# Patient Record
Sex: MALE | Race: WHITE | NOT HISPANIC OR LATINO | Employment: OTHER | URBAN - NONMETROPOLITAN AREA
[De-identification: names, ages, dates, MRNs, and addresses within clinical notes are randomized per-mention and may not be internally consistent; named-entity substitution may affect disease eponyms.]

---

## 2017-01-12 DIAGNOSIS — R73.9 HYPERGLYCEMIA: ICD-10-CM

## 2017-01-12 DIAGNOSIS — I25.10 ATHEROSCLEROSIS OF NATIVE CORONARY ARTERY OF NATIVE HEART WITHOUT ANGINA PECTORIS: ICD-10-CM

## 2017-01-12 DIAGNOSIS — Z79.899 HIGH RISK MEDICATION USE: ICD-10-CM

## 2017-01-12 DIAGNOSIS — N18.30 CKD (CHRONIC KIDNEY DISEASE), STAGE III (HCC): ICD-10-CM

## 2017-01-12 DIAGNOSIS — E78.00 PURE HYPERCHOLESTEROLEMIA: ICD-10-CM

## 2017-01-12 DIAGNOSIS — I15.9 SECONDARY HYPERTENSION: ICD-10-CM

## 2017-01-26 DIAGNOSIS — R73.9 HYPERGLYCEMIA: ICD-10-CM

## 2017-01-26 DIAGNOSIS — N18.30 CKD (CHRONIC KIDNEY DISEASE), STAGE III (HCC): ICD-10-CM

## 2017-01-26 DIAGNOSIS — E78.00 PURE HYPERCHOLESTEROLEMIA: ICD-10-CM

## 2017-01-26 DIAGNOSIS — I15.9 SECONDARY HYPERTENSION: ICD-10-CM

## 2017-01-26 DIAGNOSIS — Z79.899 HIGH RISK MEDICATION USE: ICD-10-CM

## 2017-01-27 ENCOUNTER — OFFICE VISIT (OUTPATIENT)
Dept: CARDIOLOGY | Facility: CLINIC | Age: 82
End: 2017-01-27
Payer: MEDICARE

## 2017-01-27 VITALS
HEIGHT: 70 IN | HEART RATE: 51 BPM | DIASTOLIC BLOOD PRESSURE: 70 MMHG | SYSTOLIC BLOOD PRESSURE: 130 MMHG | OXYGEN SATURATION: 96 % | BODY MASS INDEX: 24.48 KG/M2 | WEIGHT: 171 LBS

## 2017-01-27 DIAGNOSIS — I10 ESSENTIAL HYPERTENSION: ICD-10-CM

## 2017-01-27 DIAGNOSIS — Z95.1 S/P CABG X 1: ICD-10-CM

## 2017-01-27 DIAGNOSIS — E78.00 PURE HYPERCHOLESTEROLEMIA: ICD-10-CM

## 2017-01-27 DIAGNOSIS — I25.10 ATHEROSCLEROSIS OF NATIVE CORONARY ARTERY OF NATIVE HEART WITHOUT ANGINA PECTORIS: ICD-10-CM

## 2017-01-27 DIAGNOSIS — I25.2 PREVIOUS INFERIOR MYOCARDIAL INFARCTION OLDER THAN 8 WEEKS: ICD-10-CM

## 2017-01-27 PROCEDURE — 4040F PNEUMOC VAC/ADMIN/RCVD: CPT | Performed by: INTERNAL MEDICINE

## 2017-01-27 PROCEDURE — G8420 CALC BMI NORM PARAMETERS: HCPCS | Performed by: INTERNAL MEDICINE

## 2017-01-27 PROCEDURE — 99213 OFFICE O/P EST LOW 20 MIN: CPT | Performed by: INTERNAL MEDICINE

## 2017-01-27 PROCEDURE — G8484 FLU IMMUNIZE NO ADMIN: HCPCS | Performed by: INTERNAL MEDICINE

## 2017-01-27 PROCEDURE — G8432 DEP SCR NOT DOC, RNG: HCPCS | Performed by: INTERNAL MEDICINE

## 2017-01-27 PROCEDURE — G8598 ASA/ANTIPLAT THER USED: HCPCS | Performed by: INTERNAL MEDICINE

## 2017-01-27 PROCEDURE — 1101F PT FALLS ASSESS-DOCD LE1/YR: CPT | Mod: 8P | Performed by: INTERNAL MEDICINE

## 2017-01-27 PROCEDURE — 1036F TOBACCO NON-USER: CPT | Performed by: INTERNAL MEDICINE

## 2017-01-27 RX ORDER — ASPIRIN 81 MG/1
81 TABLET, CHEWABLE ORAL DAILY
Qty: 100 TAB | Refills: 6 | Status: SHIPPED | OUTPATIENT
Start: 2017-01-27 | End: 2019-03-28

## 2017-01-27 ASSESSMENT — ENCOUNTER SYMPTOMS
PND: 0
ORTHOPNEA: 0
FALLS: 0
COUGH: 0
MYALGIAS: 0
BRUISES/BLEEDS EASILY: 1
WHEEZING: 0
FLANK PAIN: 0
BLOOD IN STOOL: 0

## 2017-01-27 NOTE — Clinical Note
[facility]   [address] [place] [address]  Phone: [phone]  Fax: [phone]              William Salamanca  1935    Encounter Date: 2017    Jean-Pierre Fuller M.D.          PROGRESS NOTE:  Subjective:   William Salamanca is a 81 y.o. male who presents today for follow-up of coronary disease. He has felt well with no interval angina nor arrhythmia nor dyspnea. He's had no syncope nor near syncope.    Past Medical History   Diagnosis Date   • Hypertension    • Diabetes    • S/P CABG x 1 2013     SVBG-PDA, 3/29/2013, Dr. Escalona    • VSD (ventricular septal defect) 3/27/2013     [place] Jem patch 3/29/2013, Dr. Escalona      Past Surgical History   Procedure Laterality Date   • Ventral septal defect repair  3/29/2013     Performed by Miesha Escalona M.D. at SURGERY [facility]   • Multiple coronary artery bypass endo vein harvest  3/29/2013     Performed by Miesha Escalona M.D. at SURGERY [facility]   • Toe amputation  2013     Performed by Khang Orlando M.D. at SURGERY [facility]     Family History   Problem Relation Age of Onset   • Heart Disease Father 77      during heart surgery     History   Smoking status   • Former Smoker   • Quit date: 1980   Smokeless tobacco   • Never Used     Allergies   Allergen Reactions   • Morphine Sulfate Vomiting     Outpatient Encounter Prescriptions as of 2017   Medication Sig Dispense Refill   • losartan (COZAAR) 50 MG Tab Take 1 Tab by mouth every day. 90 Tab 3   • amlodipine (NORVASC) 5 MG Tab Take 1 Tab by mouth every day. 90 Tab 3   • metoprolol SR (TOPROL XL) 25 MG TABLET SR 24 HR Take 1 Tab by mouth every day. 90 Tab 3   • simvastatin (ZOCOR) 20 MG Tab Take 1 Tab by mouth every evening. 90 Tab 3   • clopidogrel (PLAVIX) 75 MG Tab Take 1 tablet by mouth  every day 90 Tab 3   • hydrochlorothiazide (HYDRODIURIL) 12.5 MG tablet Take 12.5 mg by  "mouth every day.     • Ibuprofen (MOTRIN PO) Take  by mouth.     • zolpidem (AMBIEN) 10 MG TABS Take 10 mg by mouth. 1/2 TABLET NIGHTLY PRN     • Multiple Vitamin (MULTI VITAMIN MENS) TABS Take  by mouth every day.     • docosahexanoic acid (OMEGA 3 FA) 1000 MG CAPS Take 1,000 mg by mouth 3 times a day, with meals.     • ascorbic acid (ASCORBIC ACID) 500 MG TABS Take 500 mg by mouth every day.     • Garlic 500 MG CAPS Take 500 mg by mouth every day.     • Calcium Carb-Cholecalciferol (CALCIUM 1000 + D PO) Take  by mouth every day.     • B Complex-Folic Acid (B COMPLEX-VITAMIN B12 PO) Take  by mouth every day.     • timolol (TIMOPTIC) 0.5 % SOLN Place 1 Drop in both eyes 2 times a day.       No facility-administered encounter medications on file as of 1/27/2017.     Review of Systems   HENT: Negative for nosebleeds.    Respiratory: Negative for cough and wheezing.    Cardiovascular: Negative for orthopnea and PND.   Gastrointestinal: Negative for blood in stool and melena.   Genitourinary: Negative for hematuria and flank pain.   Musculoskeletal: Positive for joint pain (Right knee pending arthroscopy). Negative for myalgias and falls.   Endo/Heme/Allergies: Bruises/bleeds easily (Even with Plavix alone.).     Lab data are satisfactory.   Objective:   /70 mmHg  Pulse 51  Ht 1.778 m (5' 10\")  Wt 77.565 kg (171 lb)  BMI 24.54 kg/m2  SpO2 96%  Blood pressure 130/70 at the conclusion of the exam.  Physical Exam   Constitutional: He is oriented to person, place, and time. He appears well-developed and well-nourished.   Eyes: Conjunctivae are normal.   Neck: No JVD present.   Cardiovascular: Normal rate, regular rhythm and normal heart sounds.  Exam reveals no gallop.    No murmur heard.  Pulmonary/Chest: Effort normal and breath sounds normal.   Musculoskeletal: He exhibits no edema.   Neurological: He is alert and oriented to person, place, and time.   Skin: Skin is warm and dry.   Psychiatric: He has a normal " mood and affect. Thought content normal.     EKG confirms sinus bradycardia with rare PVCs.  Assessment:     1. Atherosclerosis of native coronary artery of native heart without angina pectoris  [facility] EKG (Clinic Performed)   2. S/P CABG x 1     3. 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 Essential hypertension     5. Pure hypercholesterolemia       Clinically his cardiovascular status is very stable. He is going to require arthroscopic right knee surgery. With his easy bruising now I think we should try 81 mg of chewable or noncoated aspirin once daily and come off Plavix and we discussed that today. Apparently the orthopedist wants him to continue the Plavix right up to the time of surgery but I had them call today to verify that. It would be acceptable to me to stop Plavix now and start aspirin after surgery. We will start the aspirin after surgery whether he continues the Plavix up to surgery or not.  Medical Decision Making:  Today's Assessment / Status / Plan:     Otherwise I made no change in his regimen and will see him in follow-up in 2-3 months and immediately if any problems. There are no cardiac contraindications to his proceeding with a knee arthroscopy.  He is well versed in the use of the emergency medical system.  Immediate reevaluation for any symptoms.      No Recipients

## 2017-01-27 NOTE — PROGRESS NOTES
Subjective:   William Salamanca is a 81 y.o. male who presents today for follow-up of coronary disease. He has felt well with no interval angina nor arrhythmia nor dyspnea. He's had no syncope nor near syncope.    Past Medical History   Diagnosis Date   • Hypertension    • Diabetes    • S/P CABG x 1 2013     SVBG-PDA, 3/29/2013, Dr. Escalona    • VSD (ventricular septal defect) 3/27/2013     [place] Jem patch 3/29/2013, Dr. Escalona      Past Surgical History   Procedure Laterality Date   • Ventral septal defect repair  3/29/2013     Performed by Miesha Escalona M.D. at SURGERY [facility]   • Multiple coronary artery bypass endo vein harvest  3/29/2013     Performed by Miesha Escalona M.D. at SURGERY [facility]   • Toe amputation  2013     Performed by Khang Orlando M.D. at SURGERY [facility] [place]     Family History   Problem Relation Age of Onset   • Heart Disease Father 77      during heart surgery     History   Smoking status   • Former Smoker   • Quit date: 1980   Smokeless tobacco   • Never Used     Allergies   Allergen Reactions   • Morphine Sulfate Vomiting     Outpatient Encounter Prescriptions as of 2017   Medication Sig Dispense Refill   • losartan (COZAAR) 50 MG Tab Take 1 Tab by mouth every day. 90 Tab 3   • amlodipine (NORVASC) 5 MG Tab Take 1 Tab by mouth every day. 90 Tab 3   • metoprolol SR (TOPROL XL) 25 MG TABLET SR 24 HR Take 1 Tab by mouth every day. 90 Tab 3   • simvastatin (ZOCOR) 20 MG Tab Take 1 Tab by mouth every evening. 90 Tab 3   • clopidogrel (PLAVIX) 75 MG Tab Take 1 tablet by mouth  every day 90 Tab 3   • hydrochlorothiazide (HYDRODIURIL) 12.5 MG tablet Take 12.5 mg by mouth every day.     • Ibuprofen (MOTRIN PO) Take  by mouth.     • zolpidem (AMBIEN) 10 MG TABS Take 10 mg by mouth. 1/2 TABLET NIGHTLY PRN     • Multiple Vitamin (MULTI VITAMIN MENS) TABS Take  by mouth every day.     • docosahexanoic acid (OMEGA 3 FA) 1000 MG CAPS Take  "1,000 mg by mouth 3 times a day, with meals.     • ascorbic acid (ASCORBIC ACID) 500 MG TABS Take 500 mg by mouth every day.     • Garlic 500 MG CAPS Take 500 mg by mouth every day.     • Calcium Carb-Cholecalciferol (CALCIUM 1000 + D PO) Take  by mouth every day.     • B Complex-Folic Acid (B COMPLEX-VITAMIN B12 PO) Take  by mouth every day.     • timolol (TIMOPTIC) 0.5 % SOLN Place 1 Drop in both eyes 2 times a day.       No facility-administered encounter medications on file as of 1/27/2017.     Review of Systems   HENT: Negative for nosebleeds.    Respiratory: Negative for cough and wheezing.    Cardiovascular: Negative for orthopnea and PND.   Gastrointestinal: Negative for blood in stool and melena.   Genitourinary: Negative for hematuria and flank pain.   Musculoskeletal: Positive for joint pain (Right knee pending arthroscopy). Negative for myalgias and falls.   Endo/Heme/Allergies: Bruises/bleeds easily (Even with Plavix alone.).     Lab data are satisfactory.   Objective:   /70 mmHg  Pulse 51  Ht 1.778 m (5' 10\")  Wt 77.565 kg (171 lb)  BMI 24.54 kg/m2  SpO2 96%  Blood pressure 130/70 at the conclusion of the exam.  Physical Exam   Constitutional: He is oriented to person, place, and time. He appears well-developed and well-nourished.   Eyes: Conjunctivae are normal.   Neck: No JVD present.   Cardiovascular: Normal rate, regular rhythm and normal heart sounds.  Exam reveals no gallop.    No murmur heard.  Pulmonary/Chest: Effort normal and breath sounds normal.   Musculoskeletal: He exhibits no edema.   Neurological: He is alert and oriented to person, place, and time.   Skin: Skin is warm and dry.   Psychiatric: He has a normal mood and affect. Thought content normal.     EKG confirms sinus bradycardia with rare PVCs.  Assessment:     1. Atherosclerosis of native coronary artery of native heart without angina pectoris  [facility] EKG (Clinic Performed)   2. S/P CABG x 1     3. Previous inferior " myocardial infarction older than 8 weeks     4. Essential hypertension     5. Pure hypercholesterolemia       Clinically his cardiovascular status is very stable. He is going to require arthroscopic right knee surgery. With his easy bruising now I think we should try 81 mg of chewable or noncoated aspirin once daily and come off Plavix and we discussed that today. Apparently the orthopedist wants him to continue the Plavix right up to the time of surgery but I had them call today to verify that. It would be acceptable to me to stop Plavix now and start aspirin after surgery. We will start the aspirin after surgery whether he continues the Plavix up to surgery or not.  Medical Decision Making:  Today's Assessment / Status / Plan:     Otherwise I made no change in his regimen and will see him in follow-up in 2-3 months and immediately if any problems. There are no cardiac contraindications to his proceeding with a knee arthroscopy.  He is well versed in the use of the emergency medical system.  Immediate reevaluation for any symptoms.

## 2017-04-28 ENCOUNTER — OFFICE VISIT (OUTPATIENT)
Dept: CARDIOLOGY | Facility: CLINIC | Age: 82
End: 2017-04-28
Payer: MEDICARE

## 2017-04-28 VITALS
SYSTOLIC BLOOD PRESSURE: 110 MMHG | HEIGHT: 71 IN | DIASTOLIC BLOOD PRESSURE: 60 MMHG | BODY MASS INDEX: 24.08 KG/M2 | WEIGHT: 172 LBS | HEART RATE: 54 BPM

## 2017-04-28 DIAGNOSIS — E78.00 PURE HYPERCHOLESTEROLEMIA: ICD-10-CM

## 2017-04-28 DIAGNOSIS — I10 ESSENTIAL HYPERTENSION: ICD-10-CM

## 2017-04-28 DIAGNOSIS — I25.10 ATHEROSCLEROSIS OF NATIVE CORONARY ARTERY OF NATIVE HEART WITHOUT ANGINA PECTORIS: ICD-10-CM

## 2017-04-28 DIAGNOSIS — Q21.0 VSD (VENTRICULAR SEPTAL DEFECT): ICD-10-CM

## 2017-04-28 DIAGNOSIS — Z95.1 S/P CABG X 1: ICD-10-CM

## 2017-04-28 DIAGNOSIS — I25.2 PREVIOUS INFERIOR MYOCARDIAL INFARCTION OLDER THAN 8 WEEKS: ICD-10-CM

## 2017-04-28 PROCEDURE — G8420 CALC BMI NORM PARAMETERS: HCPCS | Performed by: INTERNAL MEDICINE

## 2017-04-28 PROCEDURE — 4040F PNEUMOC VAC/ADMIN/RCVD: CPT | Performed by: INTERNAL MEDICINE

## 2017-04-28 PROCEDURE — 99213 OFFICE O/P EST LOW 20 MIN: CPT | Performed by: INTERNAL MEDICINE

## 2017-04-28 PROCEDURE — G8598 ASA/ANTIPLAT THER USED: HCPCS | Performed by: INTERNAL MEDICINE

## 2017-04-28 PROCEDURE — G8432 DEP SCR NOT DOC, RNG: HCPCS | Performed by: INTERNAL MEDICINE

## 2017-04-28 PROCEDURE — 1101F PT FALLS ASSESS-DOCD LE1/YR: CPT | Mod: 8P | Performed by: INTERNAL MEDICINE

## 2017-04-28 PROCEDURE — 1036F TOBACCO NON-USER: CPT | Performed by: INTERNAL MEDICINE

## 2017-04-28 ASSESSMENT — ENCOUNTER SYMPTOMS
MYALGIAS: 0
PND: 0
BACK PAIN: 1
ORTHOPNEA: 0
FALLS: 0
WHEEZING: 0
PALPITATIONS: 0
COUGH: 0

## 2017-04-28 NOTE — PROGRESS NOTES
Subjective:   William Salamanca is a 82 y.o. male who presents today for f/u of CAD.  Cardiac status is clinically stable since last interval.  No chest pain, palpitations, orthopnea, edema, syncope, or near-syncope.  Exertional capacity has been stable.  No interval change otherwise.    Past Medical History   Diagnosis Date   • Hypertension    • Diabetes    • S/P CABG x 1 2013     SVBG-PDA, 3/29/2013, Dr. Escalona    • VSD (ventricular septal defect) 3/27/2013     [place] Jem patch 3/29/2013, Dr. Escalona      Past Surgical History   Procedure Laterality Date   • Ventral septal defect repair  3/29/2013     Performed by Miesha Escalona M.D. at SURGERY [facility]   • Multiple coronary artery bypass endo vein harvest  3/29/2013     Performed by Miesha Escalona M.D. at SURGERY [facility]   • Toe amputation  2013     Performed by Khang Orlando M.D. at SURGERY [facility]     Family History   Problem Relation Age of Onset   • Heart Disease Father 77      during heart surgery     History   Smoking status   • Former Smoker   • Quit date: 1980   Smokeless tobacco   • Never Used     Allergies   Allergen Reactions   • Morphine Sulfate Vomiting     Outpatient Encounter Prescriptions as of 2017   Medication Sig Dispense Refill   • aspirin (ASA) 81 MG Chew Tab chewable tablet Take 1 Tab by mouth every day. 100 Tab 6   • losartan (COZAAR) 50 MG Tab Take 1 Tab by mouth every day. 90 Tab 3   • amlodipine (NORVASC) 5 MG Tab Take 1 Tab by mouth every day. 90 Tab 3   • metoprolol SR (TOPROL XL) 25 MG TABLET SR 24 HR Take 1 Tab by mouth every day. 90 Tab 3   • simvastatin (ZOCOR) 20 MG Tab Take 1 Tab by mouth every evening. 90 Tab 3   • hydrochlorothiazide (HYDRODIURIL) 12.5 MG tablet Take 12.5 mg by mouth every day.     • Ibuprofen (MOTRIN PO) Take  by mouth.     • zolpidem (AMBIEN) 10 MG TABS Take 10 mg by mouth. 1/2 TABLET NIGHTLY PRN     • Multiple Vitamin (MULTI VITAMIN MENS) TABS  "Take  by mouth every day.     • docosahexanoic acid (OMEGA 3 FA) 1000 MG CAPS Take 1,000 mg by mouth 3 times a day, with meals.     • ascorbic acid (ASCORBIC ACID) 500 MG TABS Take 500 mg by mouth every day.     • Garlic 500 MG CAPS Take 500 mg by mouth every day.     • Calcium Carb-Cholecalciferol (CALCIUM 1000 + D PO) Take  by mouth every day.     • B Complex-Folic Acid (B COMPLEX-VITAMIN B12 PO) Take  by mouth every day.     • timolol (TIMOPTIC) 0.5 % SOLN Place 1 Drop in both eyes 2 times a day.       No facility-administered encounter medications on file as of 4/28/2017.     Review of Systems   Respiratory: Negative for cough and wheezing.    Cardiovascular: Negative for chest pain, palpitations, orthopnea, leg swelling and PND.   Musculoskeletal: Positive for back pain (and sciatica) and joint pain (No problems with arthroscopy of the R knee but recovery is slow.). Negative for myalgias and falls.        Objective:   /60 mmHg  Pulse 54  Ht 1.791 m (5' 10.5\")  Wt 78.019 kg (172 lb)  BMI 24.32 kg/m2    Physical Exam   Constitutional: He is oriented to person, place, and time. He appears well-developed and well-nourished.   Eyes: Conjunctivae are normal.   Neck: No JVD present.   Cardiovascular: Normal rate, regular rhythm and normal heart sounds.  Exam reveals no gallop.    No murmur heard.  Pulmonary/Chest: Effort normal and breath sounds normal.   Musculoskeletal: He exhibits no edema.   Neurological: He is alert and oriented to person, place, and time.   Skin: Skin is warm and dry.   Psychiatric: He has a normal mood and affect. Thought content normal.       Assessment:     1. Atherosclerosis of native coronary artery of native heart without angina pectoris     2. S/P CABG x 1     3. VSD (ventricular septal defect)     4. 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 Essential hypertension     6. Pure hypercholesterolemia       The above assessed cardiovascular problems are " clinically stable.  Post MI VSD has remained repaired.  Medical Decision Making:  Today's Assessment / Status / Plan:     Continue the current cardiovascular regimen.  Continue primary follow up with  Dr. Tamayo.   Cardiology follow up in  6 month(s) and  sooner if needed for any change.   Lab before next visit.  Use of the emergency medical system reviewed.

## 2017-04-28 NOTE — Clinical Note
[facility]   [address] [place] [address]  Phone: [phone]  Fax: [phone]              William Salamanca  1935    Encounter Date: 2017    Jean-Pierre Fuller M.D.          PROGRESS NOTE:  Subjective:   William Salamanca is a 82 y.o. male who presents today for f/u of CAD.  Cardiac status is clinically stable since last interval.  No chest pain, palpitations, orthopnea, edema, syncope, or near-syncope.  Exertional capacity has been stable.  No interval change otherwise.    Past Medical History   Diagnosis Date   • Hypertension    • Diabetes    • S/P CABG x 1 2013     SVBG-PDA, 3/29/2013, Dr. Escalona    • VSD (ventricular septal defect) 3/27/2013     [place] Jem patch 3/29/2013, Dr. Escalona      Past Surgical History   Procedure Laterality Date   • Ventral septal defect repair  3/29/2013     Performed by Miesha Escalona M.D. at SURGERY [facility]   • Multiple coronary artery bypass endo vein harvest  3/29/2013     Performed by Miesha Escalona M.D. at SURGERY [facility]   • Toe amputation  2013     Performed by Khang Orlando M.D. at [facility]     Family History   Problem Relation Age of Onset   • Heart Disease Father 77      during heart surgery     History   Smoking status   • Former Smoker   • Quit date: 1980   Smokeless tobacco   • Never Used     Allergies   Allergen Reactions   • Morphine Sulfate Vomiting     Outpatient Encounter Prescriptions as of 2017   Medication Sig Dispense Refill   • aspirin (ASA) 81 MG Chew Tab chewable tablet Take 1 Tab by mouth every day. 100 Tab 6   • losartan (COZAAR) 50 MG Tab Take 1 Tab by mouth every day. 90 Tab 3   • amlodipine (NORVASC) 5 MG Tab Take 1 Tab by mouth every day. 90 Tab 3   • metoprolol SR (TOPROL XL) 25 MG TABLET SR 24 HR Take 1 Tab by mouth every day. 90 Tab 3   • simvastatin (ZOCOR) 20 MG Tab Take 1 Tab by mouth every  "evening. 90 Tab 3   • hydrochlorothiazide (HYDRODIURIL) 12.5 MG tablet Take 12.5 mg by mouth every day.     • Ibuprofen (MOTRIN PO) Take  by mouth.     • zolpidem (AMBIEN) 10 MG TABS Take 10 mg by mouth. 1/2 TABLET NIGHTLY PRN     • Multiple Vitamin (MULTI VITAMIN MENS) TABS Take  by mouth every day.     • docosahexanoic acid (OMEGA 3 FA) 1000 MG CAPS Take 1,000 mg by mouth 3 times a day, with meals.     • ascorbic acid (ASCORBIC ACID) 500 MG TABS Take 500 mg by mouth every day.     • Garlic 500 MG CAPS Take 500 mg by mouth every day.     • Calcium Carb-Cholecalciferol (CALCIUM 1000 + D PO) Take  by mouth every day.     • B Complex-Folic Acid (B COMPLEX-VITAMIN B12 PO) Take  by mouth every day.     • timolol (TIMOPTIC) 0.5 % SOLN Place 1 Drop in both eyes 2 times a day.       No facility-administered encounter medications on file as of 4/28/2017.     Review of Systems   Respiratory: Negative for cough and wheezing.    Cardiovascular: Negative for chest pain, palpitations, orthopnea, leg swelling and PND.   Musculoskeletal: Positive for back pain (and sciatica) and joint pain (No problems with arthroscopy of the R knee but recovery is slow.). Negative for myalgias and falls.        Objective:   /60 mmHg  Pulse 54  Ht 1.791 m (5' 10.5\")  Wt 78.019 kg (172 lb)  BMI 24.32 kg/m2    Physical Exam   Constitutional: He is oriented to person, place, and time. He appears well-developed and well-nourished.   Eyes: Conjunctivae are normal.   Neck: No JVD present.   Cardiovascular: Normal rate, regular rhythm and normal heart sounds.  Exam reveals no gallop.    No murmur heard.  Pulmonary/Chest: Effort normal and breath sounds normal.   Musculoskeletal: He exhibits no edema.   Neurological: He is alert and oriented to person, place, and time.   Skin: Skin is warm and dry.   Psychiatric: He has a normal mood and affect. Thought content normal.       Assessment:     1. Atherosclerosis of native coronary artery of native " heart without angina pectoris     2. S/P CABG x 1     3. VSD (ventricular septal defect)     4. 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 Essential hypertension     6. Pure hypercholesterolemia       The above assessed cardiovascular problems are clinically stable.  Post MI VSD has remained repaired.  Medical Decision Making:  Today's Assessment / Status / Plan:     Continue the current cardiovascular regimen.  Continue primary follow up with  Dr. Tamayo.   Cardiology follow up in  6 month(s) and  sooner if needed for any change.   Lab before next visit.  Use of the emergency medical system reviewed.       No Recipients

## 2017-05-16 DIAGNOSIS — E78.00 PURE HYPERCHOLESTEROLEMIA: ICD-10-CM

## 2017-05-16 DIAGNOSIS — E78.5 DYSLIPIDEMIA: ICD-10-CM

## 2017-05-16 RX ORDER — SIMVASTATIN 20 MG
20 TABLET ORAL EVERY EVENING
Qty: 90 TAB | Refills: 3 | Status: SHIPPED | OUTPATIENT
Start: 2017-05-16 | End: 2018-04-25 | Stop reason: SDUPTHER

## 2017-07-26 DIAGNOSIS — I10 ESSENTIAL HYPERTENSION, BENIGN: ICD-10-CM

## 2017-07-26 RX ORDER — AMLODIPINE BESYLATE 5 MG/1
5 TABLET ORAL DAILY
Qty: 90 TAB | Refills: 2 | Status: SHIPPED | OUTPATIENT
Start: 2017-07-26 | End: 2018-10-04 | Stop reason: SDUPTHER

## 2017-07-26 RX ORDER — METOPROLOL SUCCINATE 25 MG/1
25 TABLET, EXTENDED RELEASE ORAL DAILY
Qty: 90 TAB | Refills: 2 | Status: SHIPPED | OUTPATIENT
Start: 2017-07-26 | End: 2018-07-17 | Stop reason: SDUPTHER

## 2017-07-26 RX ORDER — LOSARTAN POTASSIUM 50 MG/1
50 TABLET ORAL DAILY
Qty: 90 TAB | Refills: 2 | Status: SHIPPED | OUTPATIENT
Start: 2017-07-26 | End: 2018-10-04 | Stop reason: SDUPTHER

## 2017-07-26 RX ORDER — METOPROLOL SUCCINATE 25 MG/1
25 TABLET, EXTENDED RELEASE ORAL DAILY
Qty: 90 TAB | Refills: 2 | Status: SHIPPED | OUTPATIENT
Start: 2017-07-26 | End: 2017-07-26 | Stop reason: SDUPTHER

## 2017-07-26 RX ORDER — METOPROLOL SUCCINATE 25 MG/1
25 TABLET, EXTENDED RELEASE ORAL DAILY
Qty: 90 TAB | Refills: 2 | Status: CANCELLED | OUTPATIENT
Start: 2017-07-26

## 2017-09-15 DIAGNOSIS — I10 ESSENTIAL HYPERTENSION, BENIGN: ICD-10-CM

## 2017-09-18 RX ORDER — LOSARTAN POTASSIUM 50 MG/1
TABLET ORAL
Qty: 90 TAB | Refills: 3 | Status: ON HOLD | OUTPATIENT
Start: 2017-09-18 | End: 2018-02-16

## 2017-10-03 DIAGNOSIS — I10 ESSENTIAL HYPERTENSION, BENIGN: ICD-10-CM

## 2017-10-03 RX ORDER — AMLODIPINE BESYLATE 5 MG/1
TABLET ORAL
Qty: 90 TAB | Refills: 3 | Status: ON HOLD | OUTPATIENT
Start: 2017-10-03 | End: 2018-02-16

## 2018-01-18 ENCOUNTER — OFFICE VISIT (OUTPATIENT)
Dept: CARDIOLOGY | Facility: CLINIC | Age: 83
End: 2018-01-18
Payer: MEDICARE

## 2018-01-18 VITALS
WEIGHT: 173 LBS | HEIGHT: 71 IN | OXYGEN SATURATION: 91 % | SYSTOLIC BLOOD PRESSURE: 140 MMHG | BODY MASS INDEX: 24.22 KG/M2 | HEART RATE: 57 BPM | DIASTOLIC BLOOD PRESSURE: 60 MMHG

## 2018-01-18 DIAGNOSIS — Z87.74 S/P VSD REPAIR: ICD-10-CM

## 2018-01-18 DIAGNOSIS — Z95.1 S/P CABG X 1: ICD-10-CM

## 2018-01-18 DIAGNOSIS — I10 ESSENTIAL HYPERTENSION: ICD-10-CM

## 2018-01-18 DIAGNOSIS — I25.10 ATHEROSCLEROSIS OF NATIVE CORONARY ARTERY OF NATIVE HEART WITHOUT ANGINA PECTORIS: ICD-10-CM

## 2018-01-18 DIAGNOSIS — Q21.0 VSD (VENTRICULAR SEPTAL DEFECT): ICD-10-CM

## 2018-01-18 DIAGNOSIS — E78.5 HYPERLIPIDEMIA, UNSPECIFIED HYPERLIPIDEMIA TYPE: ICD-10-CM

## 2018-01-18 DIAGNOSIS — I65.21 ATHEROSCLEROSIS OF RIGHT CAROTID ARTERY: ICD-10-CM

## 2018-01-18 DIAGNOSIS — Z01.810 PRE-OPERATIVE CARDIOVASCULAR EXAMINATION: ICD-10-CM

## 2018-01-18 PROCEDURE — 99215 OFFICE O/P EST HI 40 MIN: CPT | Performed by: INTERNAL MEDICINE

## 2018-01-18 RX ORDER — HYDROCODONE BITARTRATE AND ACETAMINOPHEN 5; 325 MG/1; MG/1
1-2 TABLET ORAL EVERY 4 HOURS PRN
COMMUNITY
End: 2021-09-15

## 2018-01-18 NOTE — LETTER
[facility] [address],   2nd [address]  [place] [address]  Phone: [phone]  Fax: [phone]              William Salamanca  1935    Encounter Date: 1/18/2018    Ze Tamayo M.D.    Thank you for the referral. I had the pleasure of seeing William Salamanca today in cardiology clinic. I've attached my visit note below. If you have any questions please feel free to give me a call anytime.      Diaz Martinez MD, PhD, formerly [facility]  Cardiology and Lipidology  [facility]                                                                  PROGRESS NOTE:  Chief Complaint   Patient presents with   • Follow-Up     pre op     CAD, LVEF 40-45%    This patient is an established male who is here today to discuss:  Pre-OP for back surg, sciatica; Denies cardiac sx's    Patient Active Problem List    Diagnosis Date Noted   • High risk medication use 01/12/2017   • Pure hypercholesterolemia 08/08/2014   • CKD (chronic kidney disease), stage III 08/08/2014   • Coronary atherosclerosis of native coronary artery 07/19/2013    H/o 1 vCABG 07/19/2013   • 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 Escalona    • VSD (ventricular septal defect) 3/27/2013    Ebro Jem patch 3/29/2013, Dr. Escalona      Past Surgical History:   Procedure Laterality Date   • TOE AMPUTATION  4/16/2013    Performed by Khang Orlando M.D. at SURGERY [facility] ORS   • VENTRAL SEPTAL DEFECT REPAIR  3/29/2013    Performed by Miesha Escalona M.D. at SURGERY [facility] ORS   • MULTIPLE CORONARY ARTERY BYPASS ENDO VEIN HARVEST  3/29/2013    Performed by Miesha Escalona M.D.  at SURGERY [facility]     Social History     Social History   • Marital status:      Spouse name: N/A   • Number of children: N/A   • Years of education: N/A     Social History Main Topics   • Smoking status: Former Smoker     Quit date: 1980   • Smokeless tobacco: Never Used   • Alcohol use No   • Drug use: No   • Sexual activity: Not on file     Other Topics Concern   • Not on file     Social History Narrative   • No narrative on file     Family History   Problem Relation Age of Onset   • Heart Disease Father 77      during heart surgery       Current Outpatient Prescriptions   Medication Sig Dispense Refill   • hydrocodone-acetaminophen (NORCO) 5-325 MG Tab per tablet Take 1-2 Tabs by mouth every four hours as needed.     • amlodipine (NORVASC) 5 MG Tab Take 1 Tab by mouth every day. 90 Tab 2   • losartan (COZAAR) 50 MG Tab Take 1 Tab by mouth every day. 90 Tab 2   • metoprolol SR (TOPROL XL) 25 MG TABLET SR 24 HR Take 1 Tab by mouth every day. 90 Tab 2   • simvastatin (ZOCOR) 20 MG Tab Take 1 Tab by mouth every evening. 90 Tab 3   • aspirin (ASA) 81 MG Chew Tab chewable tablet Take 1 Tab by mouth every day. 100 Tab 6   • hydrochlorothiazide (HYDRODIURIL) 12.5 MG tablet Take 12.5 mg by mouth every day.     • Ibuprofen (MOTRIN PO) Take  by mouth.     • zolpidem (AMBIEN) 10 MG TABS Take 10 mg by mouth. 1/2 TABLET NIGHTLY PRN     • Multiple Vitamin (MULTI VITAMIN MENS) TABS Take  by mouth every day.     • docosahexanoic acid (OMEGA 3 FA) 1000 MG CAPS Take 1,000 mg by mouth 3 times a day, with meals.     • ascorbic acid (ASCORBIC ACID) 500 MG TABS Take 500 mg by mouth every day.     • timolol (TIMOPTIC) 0.5 % SOLN Place 1 Drop in both eyes 2 times a day.     • amlodipine (NORVASC) 5 MG Tab TAKE 1 TABLET BY MOUTH  EVERY DAY 90 Tab 3   • losartan (COZAAR) 50 MG Tab Take 1 tablet by mouth  every day 90 Tab 3   • Garlic 500 MG CAPS Take 500 mg by mouth every day.     • Calcium Carb-Cholecalciferol  "(CALCIUM 1000 + D PO) Take  by mouth every day.     • B Complex-Folic Acid (B COMPLEX-VITAMIN B12 PO) Take  by mouth every day.       No current facility-administered medications for this visit.      Morphine sulfate    Review of Systems:     Constitutional: Denies fevers, Denies weight changes  Eyes: Denies changes in vision, no eye pain  Ears/Nose/Throat/Mouth: Denies nasal congestion or sore throat   Cardiovascular: Denies chest pain or palpitations   Respiratory: Denies shortness of breath , Denies cough  Gastrointestinal/Hepatic: Denies abdominal pain, nausea, vomiting, diarrhea, constipation or GI bleeding   Genitourinary: Denies bladder dysfunction, dysuria or frequency  Musculoskeletal/Rheum: Denies  joint pain and swelling   Skin/Breast: Denies rash, denies breast lumps or discharge  Neurological: Denies headache, confusion, memory loss or focal weakness/parasthesias  Psychiatric: denies mood disorder   Endocrine: denies hx of diabetes or thyroid dysfunction  Heme/Oncology/Lymph Nodes: Denies enlarged lymph nodes, denies brusing or known bleeding disorder  Allergic/Immunologic: Denies hx of allergies      All other systems were reviewed and are negative (AMA/CMS criteria)      Blood pressure 140/60, pulse (!) 57, height 1.791 m (5' 10.5\"), weight 78.5 kg (173 lb), SpO2 91 %.  General Appearance:   Well developed, Well nourished, No acute distress, Non-toxic appearance.   HENT:  Normocephalic, Atraumatic, Oropharynx moist mucous membranes, Dentition: ,Rt carotid bruits;  Nose normal.    Eyes:  PERRLA, EOMI, Conjunctiva normal, No discharge.  Neck:  Normal range of motion, No cervical tenderness, Supple, No stridor, no JVD .  No thyromegaly.  No carotid bruit.  Cardiovascular:  Normal heart rate, Normal rhythm,  S1, S2, no S3,  S4; No gallops; No murmurs, No rubs, .   Extremitites with intact distal pulses, no cyanosis, clubbing or edema.  No heaves, thrills, HJR;  Peripheral pulses: carotid 2+, brachial 2+, " radial 2+, ulnar 2+, femoral 2+, popliteal 2+, PT 2+, DP 2+;  Lungs:  Respiratory effort is normal. Normal breath sounds, breath sounds clear to auscultation bilaterally,  no rales, no rhonchi, no wheezing.   Abdomen: Bowel sounds normal, Soft, No tenderness, No guarding, No rebound, No masses, No hepatosplenomegaly.  Skin: Warm, Dry, No erythema, No rash, no induration or crepitus.  Neurologic: Alert & oriented x 3, Normal motor function, Normal sensory function, No focal deficits noted, cranial nerves II through XII are normal,  normal gait.  Psychiatric: Affect normal, Judgment normal, Mood normal.      Assessment and Plan.   82 y.o. male is here for pre-OP with h/o MI, 1vCABG and VSD repair; Pls see orders for eval and carotid study;     1. Pre-operative cardiovascular examination  See above    2. Atherosclerosis of native coronary artery of native heart without angina pectoris; s/p 1vCABG rSVG>PDA    - ECHOCARDIOGRAM COMP W/O CONT; Future  - NM-HEART MUSCLE IMAGE,SPECT MULT; Future  - CBC WITHOUT DIFFERENTIAL  - COMP METABOLIC PANEL; Future  - LIPID PANEL    3. VSD (ventricular septal defect)  Repair 2012  - ECHOCARDIOGRAM COMP W/O CONT; Future    4. Essential hypertension  controlled    5. Hyperlipidemia, unspecified hyperlipidemia type    - LIPID PANEL    6. Old MI (myocardial infarction)    - ECHOCARDIOGRAM COMP W/O CONT; Future  - NM-HEART MUSCLE IMAGE,SPECT MULT; Future    7. S/P VSD repair  2012      Return to clinic in  2 , months    1. Pre-operative cardiovascular examination     2. Atherosclerosis of native coronary artery of native heart without angina pectoris  ECHOCARDIOGRAM COMP W/O CONT    NM-HEART MUSCLE IMAGE,SPECT MULT    CBC WITHOUT DIFFERENTIAL    COMP METABOLIC PANEL    LIPID PANEL   3. VSD (ventricular septal defect)  ECHOCARDIOGRAM COMP W/O CONT    2012 VSD repair   4. Essential hypertension     5. Hyperlipidemia, unspecified hyperlipidemia type  LIPID PANEL   6. Old MI (myocardial  infarction)  ECHOCARDIOGRAM COMP W/O CONT    NM-HEART MUSCLE IMAGE,SPECT MULT   7. S/P VSD repair           Ze Tamayo M.D.  [address] [place] [id number]  VIA Facsimile: [phone]

## 2018-01-18 NOTE — PROGRESS NOTES
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 Escalona    • VSD (ventricular septal defect) 3/27/2013    Gormania Jem patch 3/29/2013, Dr. Escalona      Past Surgical History:   Procedure Laterality Date   • TOE AMPUTATION  2013    Performed by Khang Orlando M.D. at SURGERY [facility]   • VENTRAL SEPTAL DEFECT REPAIR  3/29/2013    Performed by Miesha Escalona M.D. at SURGERY [facility]   • MULTIPLE CORONARY ARTERY BYPASS ENDO VEIN HARVEST  3/29/2013    Performed by Miesha Escalona M.D. at SURGERY [facility]     Social History     Social History   • Marital status:      Spouse name: N/A   • Number of children: N/A   • Years of education: N/A     Social History Main Topics   • Smoking status: Former Smoker     Quit date: 1980   • Smokeless tobacco: Never Used   • Alcohol use No   • Drug use: No   • Sexual activity: Not on file     Other Topics Concern   • Not on file     Social History Narrative   • No narrative on file     Family History   Problem Relation Age of Onset   • Heart Disease Father 77      during heart surgery       Current Outpatient Prescriptions   Medication Sig Dispense Refill   • hydrocodone-acetaminophen (NORCO)  5-325 MG Tab per tablet Take 1-2 Tabs by mouth every four hours as needed.     • amlodipine (NORVASC) 5 MG Tab Take 1 Tab by mouth every day. 90 Tab 2   • losartan (COZAAR) 50 MG Tab Take 1 Tab by mouth every day. 90 Tab 2   • metoprolol SR (TOPROL XL) 25 MG TABLET SR 24 HR Take 1 Tab by mouth every day. 90 Tab 2   • simvastatin (ZOCOR) 20 MG Tab Take 1 Tab by mouth every evening. 90 Tab 3   • aspirin (ASA) 81 MG Chew Tab chewable tablet Take 1 Tab by mouth every day. 100 Tab 6   • hydrochlorothiazide (HYDRODIURIL) 12.5 MG tablet Take 12.5 mg by mouth every day.     • Ibuprofen (MOTRIN PO) Take  by mouth.     • zolpidem (AMBIEN) 10 MG TABS Take 10 mg by mouth. 1/2 TABLET NIGHTLY PRN     • Multiple Vitamin (MULTI VITAMIN MENS) TABS Take  by mouth every day.     • docosahexanoic acid (OMEGA 3 FA) 1000 MG CAPS Take 1,000 mg by mouth 3 times a day, with meals.     • ascorbic acid (ASCORBIC ACID) 500 MG TABS Take 500 mg by mouth every day.     • timolol (TIMOPTIC) 0.5 % SOLN Place 1 Drop in both eyes 2 times a day.     • amlodipine (NORVASC) 5 MG Tab TAKE 1 TABLET BY MOUTH  EVERY DAY 90 Tab 3   • losartan (COZAAR) 50 MG Tab Take 1 tablet by mouth  every day 90 Tab 3   • Garlic 500 MG CAPS Take 500 mg by mouth every day.     • Calcium Carb-Cholecalciferol (CALCIUM 1000 + D PO) Take  by mouth every day.     • B Complex-Folic Acid (B COMPLEX-VITAMIN B12 PO) Take  by mouth every day.       No current facility-administered medications for this visit.      Morphine sulfate    Review of Systems:     Constitutional: Denies fevers, Denies weight changes  Eyes: Denies changes in vision, no eye pain  Ears/Nose/Throat/Mouth: Denies nasal congestion or sore throat   Cardiovascular: Denies chest pain or palpitations   Respiratory: Denies shortness of breath , Denies cough  Gastrointestinal/Hepatic: Denies abdominal pain, nausea, vomiting, diarrhea, constipation or GI bleeding   Genitourinary: Denies bladder dysfunction, dysuria or  "frequency  Musculoskeletal/Rheum: Denies  joint pain and swelling   Skin/Breast: Denies rash, denies breast lumps or discharge  Neurological: Denies headache, confusion, memory loss or focal weakness/parasthesias  Psychiatric: denies mood disorder   Endocrine: denies hx of diabetes or thyroid dysfunction  Heme/Oncology/Lymph Nodes: Denies enlarged lymph nodes, denies brusing or known bleeding disorder  Allergic/Immunologic: Denies hx of allergies      All other systems were reviewed and are negative (AMA/CMS criteria)      Blood pressure 140/60, pulse (!) 57, height 1.791 m (5' 10.5\"), weight 78.5 kg (173 lb), SpO2 91 %.  General Appearance:   Well developed, Well nourished, No acute distress, Non-toxic appearance.   HENT:  Normocephalic, Atraumatic, Oropharynx moist mucous membranes, Dentition: ,Rt carotid bruits;  Nose normal.    Eyes:  PERRLA, EOMI, Conjunctiva normal, No discharge.  Neck:  Normal range of motion, No cervical tenderness, Supple, No stridor, no JVD .  No thyromegaly.  No carotid bruit.  Cardiovascular:  Normal heart rate, Normal rhythm,  S1, S2, no S3,  S4; No gallops; 1-2/6 SE murmurs, No rubs, .   Extremitites with intact distal pulses, no cyanosis, clubbing or edema.  No heaves, thrills, HJR;  Peripheral pulses: carotid 2+, brachial 2+, radial 2+, ulnar 2+, femoral 2+, popliteal 2+, PT 2+, DP 2+;  Lungs:  Respiratory effort is normal. Normal breath sounds, breath sounds clear to auscultation bilaterally,  no rales, no rhonchi, no wheezing.   Abdomen: Bowel sounds normal, Soft, No tenderness, No guarding, No rebound, No masses, No hepatosplenomegaly.  Skin: Warm, Dry, No erythema, No rash, no induration or crepitus.  Neurologic: Alert & oriented x 3, Normal motor function, Normal sensory function, No focal deficits noted, cranial nerves II through XII are normal,  normal gait.  Psychiatric: Affect normal, Judgment normal, Mood normal.      Assessment and Plan.   82 y.o. male is here for pre-OP " with h/o MI, 1vCABG and VSD repair; Pls see orders for eval and carotid study;     1. Pre-operative cardiovascular examination  See above    2. Atherosclerosis of native coronary artery of native heart without angina pectoris; s/p 1vCABG rSVG>PDA    - ECHOCARDIOGRAM COMP W/O CONT; Future  - NM-HEART MUSCLE IMAGE,SPECT MULT; Future  - CBC WITHOUT DIFFERENTIAL  - COMP METABOLIC PANEL; Future  - LIPID PANEL    3. VSD (ventricular septal defect)  Repair 2012  - ECHOCARDIOGRAM COMP W/O CONT; Future    4. Essential hypertension  controlled    5. Hyperlipidemia, unspecified hyperlipidemia type    - LIPID PANEL    6. Old MI (myocardial infarction)    - ECHOCARDIOGRAM COMP W/O CONT; Future  - NM-HEART MUSCLE IMAGE,SPECT MULT; Future    7. S/P VSD repair  2012      Return to clinic in 1, months    1. Pre-operative cardiovascular examination     2. Atherosclerosis of native coronary artery of native heart without angina pectoris  ECHOCARDIOGRAM COMP W/O CONT    NM-HEART MUSCLE IMAGE,SPECT MULT    CBC WITHOUT DIFFERENTIAL    COMP METABOLIC PANEL    LIPID PANEL   3. VSD (ventricular septal defect)  ECHOCARDIOGRAM COMP W/O CONT    2012 VSD repair   4. Essential hypertension     5. Hyperlipidemia, unspecified hyperlipidemia type  LIPID PANEL   6. Old MI (myocardial infarction)  ECHOCARDIOGRAM COMP W/O CONT    NM-HEART MUSCLE IMAGE,SPECT MULT   7. S/P VSD repair

## 2018-01-22 ENCOUNTER — TELEPHONE (OUTPATIENT)
Dept: CARDIOLOGY | Facility: MEDICAL CENTER | Age: 83
End: 2018-01-22

## 2018-01-23 NOTE — TELEPHONE ENCOUNTER
Lucrecia please let mr Salamanca know his testing is stable, the repair of his VSD is looking good and his stress test shows no new areas of injury.  He can proceed with spine surgery and Hold his aspirin as necessary. He can follow-up with me in [place]    Please make sure that the record gets to his spine surgeon at [facility] Dr. Ball, I think very fax a letter but make sure    It is my pleasure to participate in the care of Mr. Salamanca.  Please do not hesitate to contact me with questions or concerns.    Aj Washington MD PhD [facility]  Cardiologist [facility]      ----- Message from Eugenia Oates sent at 1/22/2018  6:12 PM PST -----  Hey CW, This is that EC patient that needs to be cleared for surgery. [place] [facility]. Let me know if you need me to do anything. I will be happy to help.

## 2018-01-25 ENCOUNTER — TELEPHONE (OUTPATIENT)
Dept: CARDIOLOGY | Facility: MEDICAL CENTER | Age: 83
End: 2018-01-25

## 2018-01-25 NOTE — TELEPHONE ENCOUNTER
Aj Washington M.D.  Nadege Ku R.N.             Everything was stable so he could just follow up later this spring     Thank you      Patient informed that he can follow up after his surgery in the Spring.  He will call to schedule.

## 2018-01-26 ENCOUNTER — TELEPHONE (OUTPATIENT)
Dept: CARDIOLOGY | Facility: MEDICAL CENTER | Age: 83
End: 2018-01-26

## 2018-01-26 NOTE — TELEPHONE ENCOUNTER
----- Message from Aj Washington M.D. sent at 1/25/2018  5:18 PM PST -----  The duplex looks okay, will discuss further in follow up, please let him know     Thank you

## 2018-02-16 ENCOUNTER — HOSPITAL ENCOUNTER (OUTPATIENT)
Facility: MEDICAL CENTER | Age: 83
End: 2018-02-17
Attending: NEUROLOGICAL SURGERY | Admitting: NEUROLOGICAL SURGERY
Payer: MEDICARE

## 2018-02-16 ENCOUNTER — APPOINTMENT (OUTPATIENT)
Dept: RADIOLOGY | Facility: MEDICAL CENTER | Age: 83
End: 2018-02-16
Attending: NEUROLOGICAL SURGERY
Payer: MEDICARE

## 2018-02-16 DIAGNOSIS — M54.50 LOW BACK PAIN RADIATING TO RIGHT LEG: ICD-10-CM

## 2018-02-16 DIAGNOSIS — M79.604 LOW BACK PAIN RADIATING TO RIGHT LEG: ICD-10-CM

## 2018-02-16 DIAGNOSIS — M54.50 LOW BACK PAIN RADIATING TO RIGHT LOWER EXTREMITY: ICD-10-CM

## 2018-02-16 DIAGNOSIS — M79.604 LOW BACK PAIN RADIATING TO RIGHT LOWER EXTREMITY: ICD-10-CM

## 2018-02-16 LAB — GLUCOSE BLD-MCNC: 109 MG/DL (ref 65–99)

## 2018-02-16 PROCEDURE — A9270 NON-COVERED ITEM OR SERVICE: HCPCS | Performed by: PHYSICIAN ASSISTANT

## 2018-02-16 PROCEDURE — 160047 HCHG PACU  - EA ADDL 30 MINS PHASE II: Performed by: NEUROLOGICAL SURGERY

## 2018-02-16 PROCEDURE — 700111 HCHG RX REV CODE 636 W/ 250 OVERRIDE (IP)

## 2018-02-16 PROCEDURE — 160002 HCHG RECOVERY MINUTES (STAT): Performed by: NEUROLOGICAL SURGERY

## 2018-02-16 PROCEDURE — 500389 HCHG DRAIN, RESERVOIR SUCT JP 100CC: Performed by: NEUROLOGICAL SURGERY

## 2018-02-16 PROCEDURE — 700102 HCHG RX REV CODE 250 W/ 637 OVERRIDE(OP): Performed by: NEUROLOGICAL SURGERY

## 2018-02-16 PROCEDURE — A9270 NON-COVERED ITEM OR SERVICE: HCPCS

## 2018-02-16 PROCEDURE — 160009 HCHG ANES TIME/MIN: Performed by: NEUROLOGICAL SURGERY

## 2018-02-16 PROCEDURE — 700102 HCHG RX REV CODE 250 W/ 637 OVERRIDE(OP)

## 2018-02-16 PROCEDURE — 160048 HCHG OR STATISTICAL LEVEL 1-5: Performed by: NEUROLOGICAL SURGERY

## 2018-02-16 PROCEDURE — 160029 HCHG SURGERY MINUTES - 1ST 30 MINS LEVEL 4: Performed by: NEUROLOGICAL SURGERY

## 2018-02-16 PROCEDURE — 160046 HCHG PACU - 1ST 60 MINS PHASE II: Performed by: NEUROLOGICAL SURGERY

## 2018-02-16 PROCEDURE — 96374 THER/PROPH/DIAG INJ IV PUSH: CPT

## 2018-02-16 PROCEDURE — 160035 HCHG PACU - 1ST 60 MINS PHASE I: Performed by: NEUROLOGICAL SURGERY

## 2018-02-16 PROCEDURE — 160036 HCHG PACU - EA ADDL 30 MINS PHASE I: Performed by: NEUROLOGICAL SURGERY

## 2018-02-16 PROCEDURE — 160041 HCHG SURGERY MINUTES - EA ADDL 1 MIN LEVEL 4: Performed by: NEUROLOGICAL SURGERY

## 2018-02-16 PROCEDURE — 95937 NEUROMUSCULAR JUNCTION TEST: CPT | Performed by: NEUROLOGICAL SURGERY

## 2018-02-16 PROCEDURE — 500374 HCHG DRAIN, J-P FLAT 7MM FULL: Performed by: NEUROLOGICAL SURGERY

## 2018-02-16 PROCEDURE — A9270 NON-COVERED ITEM OR SERVICE: HCPCS | Performed by: NEUROLOGICAL SURGERY

## 2018-02-16 PROCEDURE — 110454 HCHG SHELL REV 250: Performed by: NEUROLOGICAL SURGERY

## 2018-02-16 PROCEDURE — 700112 HCHG RX REV CODE 229: Performed by: PHYSICIAN ASSISTANT

## 2018-02-16 PROCEDURE — 95940 IONM IN OPERATNG ROOM 15 MIN: CPT | Performed by: NEUROLOGICAL SURGERY

## 2018-02-16 PROCEDURE — 82962 GLUCOSE BLOOD TEST: CPT

## 2018-02-16 PROCEDURE — 95925 SOMATOSENSORY TESTING: CPT | Performed by: NEUROLOGICAL SURGERY

## 2018-02-16 PROCEDURE — 700101 HCHG RX REV CODE 250

## 2018-02-16 PROCEDURE — 700111 HCHG RX REV CODE 636 W/ 250 OVERRIDE (IP): Performed by: PHYSICIAN ASSISTANT

## 2018-02-16 PROCEDURE — 700102 HCHG RX REV CODE 250 W/ 637 OVERRIDE(OP): Performed by: PHYSICIAN ASSISTANT

## 2018-02-16 PROCEDURE — 72020 X-RAY EXAM OF SPINE 1 VIEW: CPT

## 2018-02-16 PROCEDURE — 500885 HCHG PACK, JACKSON TABLE: Performed by: NEUROLOGICAL SURGERY

## 2018-02-16 PROCEDURE — 95861 NEEDLE EMG 2 EXTREMITIES: CPT | Performed by: NEUROLOGICAL SURGERY

## 2018-02-16 PROCEDURE — 501411 HCHG SPONGE, BABY LAP W/O RINGS: Performed by: NEUROLOGICAL SURGERY

## 2018-02-16 PROCEDURE — 160025 RECOVERY II MINUTES (STATS): Performed by: NEUROLOGICAL SURGERY

## 2018-02-16 PROCEDURE — 501838 HCHG SUTURE GENERAL: Performed by: NEUROLOGICAL SURGERY

## 2018-02-16 PROCEDURE — G0378 HOSPITAL OBSERVATION PER HR: HCPCS

## 2018-02-16 RX ORDER — IBUPROFEN 200 MG
200 TABLET ORAL EVERY 8 HOURS PRN
COMMUNITY
End: 2021-09-15

## 2018-02-16 RX ORDER — BUPIVACAINE HYDROCHLORIDE AND EPINEPHRINE 5; 5 MG/ML; UG/ML
INJECTION, SOLUTION EPIDURAL; INTRACAUDAL; PERINEURAL
Status: DISCONTINUED | OUTPATIENT
Start: 2018-02-16 | End: 2018-02-16 | Stop reason: HOSPADM

## 2018-02-16 RX ORDER — AMLODIPINE BESYLATE 5 MG/1
5 TABLET ORAL DAILY
Status: DISCONTINUED | OUTPATIENT
Start: 2018-02-17 | End: 2018-02-17 | Stop reason: HOSPADM

## 2018-02-16 RX ORDER — SODIUM CHLORIDE 9 MG/ML
INJECTION, SOLUTION INTRAVENOUS CONTINUOUS
Status: DISCONTINUED | OUTPATIENT
Start: 2018-02-16 | End: 2018-02-17

## 2018-02-16 RX ORDER — LIDOCAINE HYDROCHLORIDE 10 MG/ML
0.5 INJECTION, SOLUTION INFILTRATION; PERINEURAL
Status: ACTIVE | OUTPATIENT
Start: 2018-02-16 | End: 2018-02-17

## 2018-02-16 RX ORDER — LOSARTAN POTASSIUM 50 MG/1
50 TABLET ORAL DAILY
Status: DISCONTINUED | OUTPATIENT
Start: 2018-02-17 | End: 2018-02-17 | Stop reason: HOSPADM

## 2018-02-16 RX ORDER — HYDROCODONE BITARTRATE AND ACETAMINOPHEN 7.5; 325 MG/1; MG/1
1-2 TABLET ORAL EVERY 6 HOURS PRN
Qty: 60 TAB | Refills: 0 | Status: SHIPPED | OUTPATIENT
Start: 2018-02-16 | End: 2018-03-02

## 2018-02-16 RX ORDER — LIDOCAINE HYDROCHLORIDE 10 MG/ML
INJECTION, SOLUTION INFILTRATION; PERINEURAL
Status: COMPLETED
Start: 2018-02-16 | End: 2018-02-16

## 2018-02-16 RX ORDER — HYDROCHLOROTHIAZIDE 25 MG/1
12.5 TABLET ORAL DAILY
Status: DISCONTINUED | OUTPATIENT
Start: 2018-02-17 | End: 2018-02-17 | Stop reason: HOSPADM

## 2018-02-16 RX ORDER — TIMOLOL MALEATE 5 MG/ML
1 SOLUTION/ DROPS OPHTHALMIC EVERY EVENING
Status: DISCONTINUED | OUTPATIENT
Start: 2018-02-16 | End: 2018-02-17 | Stop reason: HOSPADM

## 2018-02-16 RX ORDER — DIPHENHYDRAMINE HCL 25 MG
25 TABLET ORAL EVERY 6 HOURS PRN
Status: DISCONTINUED | OUTPATIENT
Start: 2018-02-16 | End: 2018-02-17

## 2018-02-16 RX ORDER — ONDANSETRON 2 MG/ML
4 INJECTION INTRAMUSCULAR; INTRAVENOUS EVERY 4 HOURS PRN
Status: DISCONTINUED | OUTPATIENT
Start: 2018-02-16 | End: 2018-02-17

## 2018-02-16 RX ORDER — HYDROCODONE BITARTRATE AND ACETAMINOPHEN 7.5; 325 MG/1; MG/1
1-2 TABLET ORAL EVERY 4 HOURS PRN
Qty: 60 TAB | Refills: 0 | Status: SHIPPED | OUTPATIENT
Start: 2018-02-16 | End: 2018-03-02

## 2018-02-16 RX ORDER — AMOXICILLIN 250 MG
1 CAPSULE ORAL NIGHTLY
Status: DISCONTINUED | OUTPATIENT
Start: 2018-02-16 | End: 2018-02-17

## 2018-02-16 RX ORDER — TIZANIDINE 4 MG/1
2 TABLET ORAL 3 TIMES DAILY PRN
Status: DISCONTINUED | OUTPATIENT
Start: 2018-02-16 | End: 2018-02-17

## 2018-02-16 RX ORDER — METHOCARBAMOL 750 MG/1
750 TABLET, FILM COATED ORAL 3 TIMES DAILY PRN
Qty: 60 TAB | Refills: 0 | Status: SHIPPED | OUTPATIENT
Start: 2018-02-16 | End: 2019-03-28

## 2018-02-16 RX ORDER — BISACODYL 10 MG
10 SUPPOSITORY, RECTAL RECTAL
Status: DISCONTINUED | OUTPATIENT
Start: 2018-02-16 | End: 2018-02-17

## 2018-02-16 RX ORDER — ENEMA 19; 7 G/133ML; G/133ML
1 ENEMA RECTAL
Status: DISCONTINUED | OUTPATIENT
Start: 2018-02-16 | End: 2018-02-17

## 2018-02-16 RX ORDER — TAMSULOSIN HYDROCHLORIDE 0.4 MG/1
0.4 CAPSULE ORAL
Status: DISCONTINUED | OUTPATIENT
Start: 2018-02-16 | End: 2018-02-17 | Stop reason: HOSPADM

## 2018-02-16 RX ORDER — DEXAMETHASONE SODIUM PHOSPHATE 4 MG/ML
4 INJECTION, SOLUTION INTRA-ARTICULAR; INTRALESIONAL; INTRAMUSCULAR; INTRAVENOUS; SOFT TISSUE EVERY 6 HOURS
Status: COMPLETED | OUTPATIENT
Start: 2018-02-16 | End: 2018-02-17

## 2018-02-16 RX ORDER — METHOCARBAMOL 750 MG/1
750 TABLET, FILM COATED ORAL 3 TIMES DAILY PRN
Qty: 90 TAB | Refills: 0 | Status: SHIPPED | OUTPATIENT
Start: 2018-02-16 | End: 2019-03-28

## 2018-02-16 RX ORDER — OXYCODONE HCL 5 MG/5 ML
SOLUTION, ORAL ORAL
Status: COMPLETED
Start: 2018-02-16 | End: 2018-02-16

## 2018-02-16 RX ORDER — VANCOMYCIN HCL 900 MCG/MG
POWDER (GRAM) MISCELLANEOUS
Status: DISCONTINUED | OUTPATIENT
Start: 2018-02-16 | End: 2018-02-16 | Stop reason: HOSPADM

## 2018-02-16 RX ORDER — BACITRACIN 50000 [IU]/1
INJECTION, POWDER, FOR SOLUTION INTRAMUSCULAR
Status: DISCONTINUED | OUTPATIENT
Start: 2018-02-16 | End: 2018-02-16 | Stop reason: HOSPADM

## 2018-02-16 RX ORDER — AMOXICILLIN 250 MG
1 CAPSULE ORAL
Status: DISCONTINUED | OUTPATIENT
Start: 2018-02-16 | End: 2018-02-17

## 2018-02-16 RX ORDER — LIDOCAINE AND PRILOCAINE 25; 25 MG/G; MG/G
1 CREAM TOPICAL
Status: ACTIVE | OUTPATIENT
Start: 2018-02-16 | End: 2018-02-17

## 2018-02-16 RX ORDER — DIPHENHYDRAMINE HYDROCHLORIDE 50 MG/ML
25 INJECTION INTRAMUSCULAR; INTRAVENOUS EVERY 6 HOURS PRN
Status: DISCONTINUED | OUTPATIENT
Start: 2018-02-16 | End: 2018-02-17

## 2018-02-16 RX ORDER — ZOLPIDEM TARTRATE 5 MG/1
5 TABLET ORAL NIGHTLY PRN
Status: DISCONTINUED | OUTPATIENT
Start: 2018-02-16 | End: 2018-02-17 | Stop reason: HOSPADM

## 2018-02-16 RX ORDER — POLYETHYLENE GLYCOL 3350 17 G/17G
1 POWDER, FOR SOLUTION ORAL 2 TIMES DAILY PRN
Status: DISCONTINUED | OUTPATIENT
Start: 2018-02-16 | End: 2018-02-17

## 2018-02-16 RX ORDER — CALCIUM CARBONATE 500 MG/1
500 TABLET, CHEWABLE ORAL 2 TIMES DAILY
Status: DISCONTINUED | OUTPATIENT
Start: 2018-02-16 | End: 2018-02-17

## 2018-02-16 RX ORDER — METOPROLOL SUCCINATE 25 MG/1
25 TABLET, EXTENDED RELEASE ORAL DAILY
Status: DISCONTINUED | OUTPATIENT
Start: 2018-02-17 | End: 2018-02-17 | Stop reason: HOSPADM

## 2018-02-16 RX ORDER — TAMSULOSIN HYDROCHLORIDE 0.4 MG/1
0.8 CAPSULE ORAL ONCE
Status: COMPLETED | OUTPATIENT
Start: 2018-02-16 | End: 2018-02-16

## 2018-02-16 RX ORDER — HYDROCODONE BITARTRATE AND ACETAMINOPHEN 7.5; 325 MG/1; MG/1
TABLET ORAL
Status: COMPLETED
Start: 2018-02-16 | End: 2018-02-16

## 2018-02-16 RX ORDER — ONDANSETRON 4 MG/1
4 TABLET, ORALLY DISINTEGRATING ORAL EVERY 4 HOURS PRN
Status: DISCONTINUED | OUTPATIENT
Start: 2018-02-16 | End: 2018-02-17

## 2018-02-16 RX ORDER — ASCORBIC ACID 500 MG
500 TABLET ORAL DAILY
Status: DISCONTINUED | OUTPATIENT
Start: 2018-02-17 | End: 2018-02-17 | Stop reason: HOSPADM

## 2018-02-16 RX ORDER — SIMVASTATIN 20 MG
20 TABLET ORAL EVERY EVENING
Status: DISCONTINUED | OUTPATIENT
Start: 2018-02-16 | End: 2018-02-17 | Stop reason: HOSPADM

## 2018-02-16 RX ORDER — SODIUM CHLORIDE, SODIUM LACTATE, POTASSIUM CHLORIDE, AND CALCIUM CHLORIDE .6; .31; .03; .02 G/100ML; G/100ML; G/100ML; G/100ML
IRRIGANT IRRIGATION
Status: DISCONTINUED | OUTPATIENT
Start: 2018-02-16 | End: 2018-02-16 | Stop reason: HOSPADM

## 2018-02-16 RX ORDER — OXYCODONE HYDROCHLORIDE 5 MG/1
2.5 TABLET ORAL
Status: DISCONTINUED | OUTPATIENT
Start: 2018-02-16 | End: 2018-02-17

## 2018-02-16 RX ORDER — OXYCODONE HYDROCHLORIDE 5 MG/1
5 TABLET ORAL
Status: DISCONTINUED | OUTPATIENT
Start: 2018-02-16 | End: 2018-02-17

## 2018-02-16 RX ORDER — DOCUSATE SODIUM 100 MG/1
100 CAPSULE, LIQUID FILLED ORAL 2 TIMES DAILY
Status: DISCONTINUED | OUTPATIENT
Start: 2018-02-16 | End: 2018-02-17

## 2018-02-16 RX ADMIN — HYDROCODONE BITARTRATE AND ACETAMINOPHEN 7.5 TABLET: 7.5; 325 TABLET ORAL at 17:50

## 2018-02-16 RX ADMIN — FENTANYL CITRATE 25 MCG: 50 INJECTION, SOLUTION INTRAMUSCULAR; INTRAVENOUS at 09:45

## 2018-02-16 RX ADMIN — DOCUSATE SODIUM 100 MG: 100 CAPSULE ORAL at 20:34

## 2018-02-16 RX ADMIN — TAMSULOSIN HYDROCHLORIDE 0.8 MG: 0.4 CAPSULE ORAL at 13:10

## 2018-02-16 RX ADMIN — ANTACID TABLETS 500 MG: 500 TABLET, CHEWABLE ORAL at 20:39

## 2018-02-16 RX ADMIN — SODIUM CHLORIDE: 9 INJECTION, SOLUTION INTRAVENOUS at 06:45

## 2018-02-16 RX ADMIN — TAMSULOSIN HYDROCHLORIDE 0.4 MG: 0.4 CAPSULE ORAL at 20:34

## 2018-02-16 RX ADMIN — SIMVASTATIN 20 MG: 20 TABLET, FILM COATED ORAL at 20:34

## 2018-02-16 RX ADMIN — OXYCODONE HYDROCHLORIDE 5 MG: 5 TABLET ORAL at 20:34

## 2018-02-16 RX ADMIN — OXYCODONE HYDROCHLORIDE 5 MG: 5 SOLUTION ORAL at 09:45

## 2018-02-16 RX ADMIN — DEXAMETHASONE SODIUM PHOSPHATE 4 MG: 4 INJECTION, SOLUTION INTRAMUSCULAR; INTRAVENOUS at 20:33

## 2018-02-16 RX ADMIN — FENTANYL CITRATE 25 MCG: 50 INJECTION, SOLUTION INTRAMUSCULAR; INTRAVENOUS at 10:00

## 2018-02-16 RX ADMIN — STANDARDIZED SENNA CONCENTRATE AND DOCUSATE SODIUM 1 TABLET: 8.6; 5 TABLET, FILM COATED ORAL at 20:34

## 2018-02-16 RX ADMIN — LIDOCAINE HYDROCHLORIDE: 10 INJECTION, SOLUTION INFILTRATION; PERINEURAL at 06:45

## 2018-02-16 ASSESSMENT — LIFESTYLE VARIABLES
EVER_SMOKED: NEVER
HAVE PEOPLE ANNOYED YOU BY CRITICIZING YOUR DRINKING: NO
HOW MANY TIMES IN THE PAST YEAR HAVE YOU HAD 5 OR MORE DRINKS IN A DAY: 1
CONSUMPTION TOTAL: POSITIVE
TOTAL SCORE: 0
TOTAL SCORE: 0
ALCOHOL_USE: YES
HAVE YOU EVER FELT YOU SHOULD CUT DOWN ON YOUR DRINKING: NO
EVER HAD A DRINK FIRST THING IN THE MORNING TO STEADY YOUR NERVES TO GET RID OF A HANGOVER: NO
TOTAL SCORE: 0
EVER FELT BAD OR GUILTY ABOUT YOUR DRINKING: NO
ON A TYPICAL DAY WHEN YOU DRINK ALCOHOL HOW MANY DRINKS DO YOU HAVE: 1
AVERAGE NUMBER OF DAYS PER WEEK YOU HAVE A DRINK CONTAINING ALCOHOL: 2

## 2018-02-16 ASSESSMENT — PAIN SCALES - GENERAL
PAINLEVEL_OUTOF10: 3
PAINLEVEL_OUTOF10: 5
PAINLEVEL_OUTOF10: 3
PAINLEVEL_OUTOF10: 0
PAINLEVEL_OUTOF10: 4
PAINLEVEL_OUTOF10: 3
PAINLEVEL_OUTOF10: 5
PAINLEVEL_OUTOF10: 3
PAINLEVEL_OUTOF10: 6
PAINLEVEL_OUTOF10: 4

## 2018-02-16 ASSESSMENT — PATIENT HEALTH QUESTIONNAIRE - PHQ9
SUM OF ALL RESPONSES TO PHQ QUESTIONS 1-9: 0
SUM OF ALL RESPONSES TO PHQ9 QUESTIONS 1 AND 2: 0
1. LITTLE INTEREST OR PLEASURE IN DOING THINGS: NOT AT ALL
2. FEELING DOWN, DEPRESSED, IRRITABLE, OR HOPELESS: NOT AT ALL

## 2018-02-16 NOTE — PROGRESS NOTES
Pt ambulatory, gait steady and VSS, Pt attempted to void twice and was unable to, bladder scan results of 329 ml. Dr. Ball notified and new orders received to give 0.8mg of flomax, encourage more fluids and keep pt in discharge area until able to void.

## 2018-02-16 NOTE — PROGRESS NOTES
Pt up to bathroom, pt states that he voided but it was a small amount. Bladder scan post void residual 565ml, MD notified

## 2018-02-16 NOTE — OP REPORT
Date: 2/16/2018  Surgeon: Celestino Ball MD  1st Asst.: Marcia Chapa PA-C  Anesthesia: GETA    Preoperative diagnosis  1. Right L5-S1 foraminal stenosis  2. Right S1 radiculopathy  3. Failure of conservative measures  4. Lumbar degenerative disc disease    Postoperative diagnosis  1. Right L5-S1 foraminal stenosis  2. Right S1 radiculopathy  3. Failure of conservative measures  4. Lumbar degenerative disc disease    Procedures performed  1. Right L5 transpedicular approach decompression of the right L5 nerve root   2. Right S1 transpedicular approach with decompression of the right S1 nerve root  3. Foraminotomy right L5  4. Foraminotomy right S1  5. Microscope microscopic dissection    Indication for procedure  This is an 82-year-old male with severe progressive right lower extremity radiculopathy predominantly in the right S1 distribution. He also had features of right L5 radiculopathy. His MRI showed severe foraminal stenosis on the right at L5-S1 due to degenerative disc disease and facet arthropathy. He had failed a long course of conservative measures and surgery is indicated for decompression of the right L5 and right S1 nerves    Procedure in detail  The patient was brought to the operating room and intubated by the anesthesiologist. He was placed prone on the Kem OSI table. Preoperative fluoroscopy was used to identify the appropriate surgical level. He was prepped and draped in usual sterile fashion. A preprocedure timeout was performed. 0.5% Marcaine with epinephrine was infiltrated in the skin. A 10 blade was used to sharply incise the skin and subcutaneous tissue. Monopolar electrocautery was used for subperiosteal dissection of the multifidus muscle right hemilamina at L5 and S1. A minimally invasive, self-retaining retractor was placed. Fluoroscopy was used to identify the appropriate surgical level. The microscope was brought in the field. A high-speed [employer] drill was used to make a  hemilaminotomy right L5 and right S1. Very overgrown and hypertrophied ligamentum flavum was noted in the right L5-S1 interspace. I attempted to disconnect this from the undersurface of the L5 lamina with an angled curette. The ligamentum flavum was noted to be adhered to the dura. Made a further laminotomy with the [employer] Anthony drill of her right L5 and right S1. I did use a pituitary to remove some of the hypertrophic ligamentum flavum. Again this was noted to be quite adhesed to the dura. Because of this, I could not fully resect it without risking a cerebrospinal fluid leak and therefore I used the [employer] Anthony drill to perform a transpedicular approach of the right L5 pedicle as well as right S1 pedicle in order to allow access to the shoulder of the right L5 and right S1 nerve roots respectively. This was successful, drilling done greater than 50% of right L5 and right S1 pedicles. I was able to detach the ligamentum from the right L5 and right S1 nerve roots with an angled curette and worked that detachment point superiorly until the junction where the dura was completely adhesed to the ligamentum flavum which was over the shoulder of the left S1 nerve root. I was able to resect the dura around this juncture and creating generous foraminotomies over the right S1 and right L5 nerve roots. By the end of this, I felt that the right L5 and right S1 nerve roots were free of any and all compression except for that small area of dural adhesion to the ligamentum flavum. The wound was copiously irrigated. Surgi-Magnus was used in the epidural space for hemostasis. The microscope was removed. 1 g of vancomycin powder was infiltrated deep in the wound. The wound was closed in layers. Steri-Strips are applied to the skin. The patient was extubated in the operating room and taken to PACU in stable condition.    Estimated blood loss  25 mL    Complications  None noted

## 2018-02-16 NOTE — OR NURSING
"Pt awake and complains of L shoulder pain; states that \"it doesn't work so well\" with noo c/o pain in lower back. Discussed with pt re; positioning during surgery being that the probable cause of the discomfort.  "

## 2018-02-16 NOTE — PROGRESS NOTES
1515 Assumed care of pt, pt ambulating in farias with wife, will update MD at 1700 on pt status. Pt vss, denies pain/nausea, will continue to monitor    1630- pt still only to dribble out urine. PVR showing 500mls. Page to MD made    1645- Orders received from BANDAR Gill to straight cath x1, and continue to monitor to make sure pt can urinate on own    1700- straight cath completed, 530mls, pt states feels much better    1715- pt tolerating PO and crackers, vss, will continue to monitor, pt c/o increasing pain in back, page to MD for med orders    1735- still awaiting return call from MD for pain meds    1745- Kiko Pimentel returned page and gave verbal order for 7.5mg norco x1 now.     1800- pt resting in bed, will continue to monitor and wait for pt to void, pt drinking water    1820- pt attempted to void, only able to dribbles of urine, and PVR showing 525ml, page made to MD    1830- orders received to insert ruiz cath and admit pt for observation overnight, pt and family updated    1930- report called to floor URBANO Morse    1940- pt has all belongings, vss, wife at bedside, transported to new room

## 2018-02-16 NOTE — PROGRESS NOTES
Per Dr. Ball, the patient is to wait here in discahrge a few hours longer to see if he can void and to dc fluids at this time. Family updated and verbalized understanding.

## 2018-02-17 VITALS
RESPIRATION RATE: 16 BRPM | SYSTOLIC BLOOD PRESSURE: 126 MMHG | TEMPERATURE: 97.1 F | OXYGEN SATURATION: 90 % | WEIGHT: 169.75 LBS | HEIGHT: 70 IN | DIASTOLIC BLOOD PRESSURE: 56 MMHG | BODY MASS INDEX: 24.3 KG/M2 | HEART RATE: 91 BPM

## 2018-02-17 PROCEDURE — G8988 SELF CARE GOAL STATUS: HCPCS | Mod: CI

## 2018-02-17 PROCEDURE — G8979 MOBILITY GOAL STATUS: HCPCS | Mod: CI

## 2018-02-17 PROCEDURE — G8978 MOBILITY CURRENT STATUS: HCPCS | Mod: CI

## 2018-02-17 PROCEDURE — 700111 HCHG RX REV CODE 636 W/ 250 OVERRIDE (IP): Performed by: PHYSICIAN ASSISTANT

## 2018-02-17 PROCEDURE — 97161 PT EVAL LOW COMPLEX 20 MIN: CPT

## 2018-02-17 PROCEDURE — 97165 OT EVAL LOW COMPLEX 30 MIN: CPT

## 2018-02-17 PROCEDURE — 96376 TX/PRO/DX INJ SAME DRUG ADON: CPT

## 2018-02-17 PROCEDURE — G8980 MOBILITY D/C STATUS: HCPCS | Mod: CI

## 2018-02-17 PROCEDURE — 700102 HCHG RX REV CODE 250 W/ 637 OVERRIDE(OP): Performed by: PHYSICIAN ASSISTANT

## 2018-02-17 PROCEDURE — A9270 NON-COVERED ITEM OR SERVICE: HCPCS | Performed by: PHYSICIAN ASSISTANT

## 2018-02-17 PROCEDURE — G0378 HOSPITAL OBSERVATION PER HR: HCPCS

## 2018-02-17 PROCEDURE — G8989 SELF CARE D/C STATUS: HCPCS | Mod: CI

## 2018-02-17 PROCEDURE — 700112 HCHG RX REV CODE 229: Performed by: PHYSICIAN ASSISTANT

## 2018-02-17 PROCEDURE — G8987 SELF CARE CURRENT STATUS: HCPCS | Mod: CI

## 2018-02-17 RX ORDER — METHOCARBAMOL 750 MG/1
750 TABLET, FILM COATED ORAL 3 TIMES DAILY
Qty: 90 TAB | Refills: 1 | Status: SHIPPED | OUTPATIENT
Start: 2018-02-17 | End: 2021-09-15

## 2018-02-17 RX ORDER — OXYCODONE HYDROCHLORIDE AND ACETAMINOPHEN 5; 325 MG/1; MG/1
1 TABLET ORAL EVERY 4 HOURS PRN
Qty: 14 TAB | Refills: 0 | Status: SHIPPED | OUTPATIENT
Start: 2018-02-17 | End: 2018-05-12

## 2018-02-17 RX ADMIN — LOSARTAN POTASSIUM 50 MG: 50 TABLET, FILM COATED ORAL at 07:59

## 2018-02-17 RX ADMIN — DOCUSATE SODIUM 100 MG: 100 CAPSULE ORAL at 07:58

## 2018-02-17 RX ADMIN — DEXAMETHASONE SODIUM PHOSPHATE 4 MG: 4 INJECTION, SOLUTION INTRAMUSCULAR; INTRAVENOUS at 01:11

## 2018-02-17 RX ADMIN — ANTACID TABLETS 500 MG: 500 TABLET, CHEWABLE ORAL at 07:58

## 2018-02-17 RX ADMIN — TIZANIDINE 2 MG: 4 TABLET ORAL at 01:11

## 2018-02-17 RX ADMIN — VITAMIN D, TAB 1000IU (100/BT) 5000 UNITS: 25 TAB at 07:58

## 2018-02-17 RX ADMIN — METOPROLOL SUCCINATE 25 MG: 25 TABLET, EXTENDED RELEASE ORAL at 07:59

## 2018-02-17 RX ADMIN — DEXAMETHASONE SODIUM PHOSPHATE 4 MG: 4 INJECTION, SOLUTION INTRAMUSCULAR; INTRAVENOUS at 05:50

## 2018-02-17 RX ADMIN — OXYCODONE HYDROCHLORIDE AND ACETAMINOPHEN 500 MG: 500 TABLET ORAL at 08:00

## 2018-02-17 RX ADMIN — HYDROCHLOROTHIAZIDE 12.5 MG: 25 TABLET ORAL at 07:59

## 2018-02-17 RX ADMIN — OXYCODONE HYDROCHLORIDE 5 MG: 5 TABLET ORAL at 05:50

## 2018-02-17 RX ADMIN — ENOXAPARIN SODIUM 40 MG: 100 INJECTION SUBCUTANEOUS at 10:21

## 2018-02-17 RX ADMIN — TAMSULOSIN HYDROCHLORIDE 0.4 MG: 0.4 CAPSULE ORAL at 07:58

## 2018-02-17 RX ADMIN — AMLODIPINE BESYLATE 5 MG: 5 TABLET ORAL at 07:59

## 2018-02-17 RX ADMIN — OXYCODONE HYDROCHLORIDE 5 MG: 5 TABLET ORAL at 01:10

## 2018-02-17 ASSESSMENT — COGNITIVE AND FUNCTIONAL STATUS - GENERAL
EATING MEALS: A LITTLE
PERSONAL GROOMING: A LITTLE
CLIMB 3 TO 5 STEPS WITH RAILING: A LITTLE
TOILETING: A LITTLE
DAILY ACTIVITIY SCORE: 18
SUGGESTED CMS G CODE MODIFIER MOBILITY: CI
DRESSING REGULAR LOWER BODY CLOTHING: A LITTLE
SUGGESTED CMS G CODE MODIFIER DAILY ACTIVITY: CK
MOBILITY SCORE: 23
HELP NEEDED FOR BATHING: A LITTLE
DRESSING REGULAR UPPER BODY CLOTHING: A LITTLE

## 2018-02-17 ASSESSMENT — ACTIVITIES OF DAILY LIVING (ADL): TOILETING: INDEPENDENT

## 2018-02-17 ASSESSMENT — GAIT ASSESSMENTS
GAIT LEVEL OF ASSIST: SUPERVISED
DISTANCE (FEET): 250

## 2018-02-17 ASSESSMENT — PAIN SCALES - GENERAL: PAINLEVEL_OUTOF10: 4

## 2018-02-17 NOTE — PROGRESS NOTES
Neurosurgery Progress Note    Subjective:  POD#0  MIS Right L5 and S1 Laminotomy and Foraminotomy  Surgery went well and the pt had been set up for discharge.  He had significant Urinary retention and was straight cath'ed x 2 with pt unable to urinate since then.   Bladder scan showed 550cc residual so William inserted and patient started on Flomax.  Admitted to 23 obs with hope to discharge in AM.      Exam:  Wound C/D/I  Abdomen soft NT/ND  Feeling better since cath in place.  Moving all ext well          BP  Min: 143/56  Max: 143/56  Pulse  Av.5  Min: 49  Max: 80  Resp  Avg: 15.8  Min: 11  Max: 18  Temp  Av.6 °C (97.9 °F)  Min: 36.2 °C (97.1 °F)  Max: 37.1 °C (98.8 °F)  SpO2  Av.6 %  Min: 94 %  Max: 99 %    No Data Recorded        No results for input(s): SODIUM, POTASSIUM, CHLORIDE, CO2, GLUCOSE, BUN, CPKTOTAL in the last 72 hours.            Intake/Output       02/15/18 0700 - 18 0659 (Not Admitted) //18 0700 - /18 0659      3399-6710 4773-2552 Total 9402-7984 5182-5971 Total       Intake    I.V.  --  -- --  900  -- 900    Crystalloid Intake -- -- -- 900 -- 900    Total Intake -- -- -- 900 -- 900       Output    Urine  --  -- --  --  -- --    Number of Times Voided -- -- -- 2 x -- 2 x    Blood  --  -- --  50  -- 50    Est. Blood Loss (mL) -- -- -- 50 -- 50    Total Output -- -- -- 50 -- 50       Net I/O     -- -- -- 850 -- 850            Intake/Output Summary (Last 24 hours) at 18 1858  Last data filed at 18 0924   Gross per 24 hour   Intake              900 ml   Output               50 ml   Net              850 ml            • lidocaine-prilocaine  1 Application Once PRN    Or   • lidocaine  0.5 mL Once PRN   • NS   Continuous   • Vancomycin HCl   Intra-Op Once PRN       Assessment and Plan:  POD #0  MIS Right L5-and Si laminotomy and foraminotomy.  William placed for urinary retention with 2 straight cath with rention persisting  Will Start Pain meds/Flomax and consult  Urology for f/u Monday or Tuesday  Admitted to 23 hour obs.

## 2018-02-17 NOTE — DISCHARGE INSTRUCTIONS
Discharge Instructions    Discharged to home by car with relative. Discharged via wheelchair, hospital escort: Yes.  Special equipment needed: Not Applicable      Be sure to schedule a follow-up appointment with your primary care doctor or any specialists as instructed.     Discharge Plan:   Influenza Vaccine Indication: Not indicated: Previously immunized this influenza season and > 8 years of age    I understand that a diet low in cholesterol, fat, and sodium is recommended for good health. Unless I have been given specific instructions below for another diet, I accept this instruction as my diet prescription.   Depression / Suicide Risk    As you are discharged from this [facility] facility, it is important to learn how to keep safe from harming yourself.    Recognize the warning signs:  · Abrupt changes in personality, positive or negative- including increase in energy   · Giving away possessions  · Change in eating patterns- significant weight changes-  positive or negative  · Change in sleeping patterns- unable to sleep or sleeping all the time   · Unwillingness or inability to communicate  · Depression  · Unusual sadness, discouragement and loneliness  · Talk of wanting to die  · Neglect of personal appearance   · Rebelliousness- reckless behavior  · Withdrawal from people/activities they love  · Confusion- inability to concentrate     If you or a loved one observes any of these behaviors or has concerns about self-harm, here's what you can do:  · Talk about it- your feelings and reasons for harming yourself  · Remove any means that you might use to hurt yourself (examples: pills, rope, extension cords, firearm)  · Get professional help from the community (Mental Health, Substance Abuse, psychological counseling)  · Do not be alone:Call your Safe Contact- someone whom you trust who will be there for you.  · Call your local CRISIS HOTLINE 128-1167 or 000-364-9954  · Call your local Children's Mobile Crisis  Response Team [facility] (019) 415-7602 or www.SpinGo  · Call the toll free National Suicide Prevention Hotlines   · National Suicide Prevention Lifeline 794-168-KMYW (1131)  · National Hope Line Network 800-SUICIDE (745-6626)      ACTIVITY: Rest and take it easy for the first 24 hours.  A responsible adult is recommended to remain with you during that time.  It is normal to feel sleepy.  We encourage you to not do anything that requires balance, judgment or coordination.    MILD FLU-LIKE SYMPTOMS ARE NORMAL. YOU MAY EXPERIENCE GENERALIZED MUSCLE ACHES, THROAT IRRITATION, HEADACHE AND/OR SOME NAUSEA.    FOR 24 HOURS DO NOT:  Drive, operate machinery or run household appliances.  Drink beer or alcoholic beverages.   Make important decisions or sign legal documents.    SPECIAL INSTRUCTIONS:     Ok to remove outer dressing in 4 days,   Ok to shower with tegaderm dressing on starting today  Keep steri strips on, they'll fall on their own  Comments: D/c to home.   Activity restrictions and wound care as per pre-op Instruction sheet.   May shower tomorrow,   F/u in 2 week with [facility] or call if symptoms worsen or if concerns arise. [phone]      Lumbar Laminectomy, Care After  Refer to this sheet in the next few weeks. These instructions provide you with information on caring for yourself after your procedure. Your health care provider may also give you more specific instructions. Your treatment has been planned according to current medical practices, but problems sometimes occur. Call your health care provider if you have any problems or questions after your procedure.  HOME CARE INSTRUCTIONS   · Check the incision twice a day for signs of infection. Some signs may include a foul-smelling, greenish or yellowish discharge from the wound, increased pain, or increased redness over the incision.  · Change your bandages about 24-36 hours after surgery, or as directed.  · You may shower once the bandage  is removed, or as directed. Avoid tub baths, swimming, and hot tubs for 3 weeks or until your incision has healed completely. If you have stitches or staples, they may be removed 2-3 weeks after surgery, or as directed by your doctor.  · Daily exercise is helpful to prevent the return of problems. Walking is permitted. You may use a treadmill without an incline. Cut down on activities and exercise if you have discomfort. You may also go up and down stairs as much as you can tolerate.  · Do not lift anything heavier than 15 lb. Avoid bending or twisting at the waist. Always bend your knees.  · Maintain strength and range of motion as instructed.  · Do not drive for 2-3 weeks, or as directed by your doctor. You may be a passenger for 20-30 minutes at a time. Lying back in the passenger seat may be more comfortable for you.  · Limit your sitting to intervals of 20-30 minutes. You should lie down or walk in between sitting periods. There are no limitations for sitting in a recliner chair.  · Only take over-the-counter or prescription medicines for pain, discomfort, or fever as directed by your health care provider.  SEEK MEDICAL CARE IF:   · There is increased bleeding (more than a small spot) from the wound.  · You notice redness, swelling, or increasing pain in the wound.  · Pus is coming from the wound.  · You have a fever for more than 2-3 days.  · You notice a foul smell coming from the wound or dressing.  · You have increasing pain in your wound.  SEEK IMMEDIATE MEDICAL CARE IF:   · You develop a rash.  · You have difficulty breathing.  · You have any allergic problems.  · You develop a headache or stiff neck that does not respond to pain relievers.  · You are unable to urinate.  · You develop new onset of pain, numbness, or weakness in the buttocks or lower extremities.  MAKE SURE YOU:   · Understand these instructions.  · Will watch your condition.  · Will get help right away if you are not doing well or get  worse.     This information is not intended to replace advice given to you by your health care provider. Make sure you discuss any questions you have with your health care provider.     Document Released: 11/21/2005 Document Revised: 08/20/2014 Document Reviewed: 05/15/2014  [employer] Interactive Patient Education ©2016 [employer]      DIET: To avoid nausea, slowly advance diet as tolerated, avoiding spicy or greasy foods for the first day.  Add more substantial food to your diet according to your physician's instructions.  Babies can be fed formula or breast milk as soon as they are hungry.  INCREASE FLUIDS AND FIBER TO AVOID CONSTIPATION.    SURGICAL DRESSING/BATHING:     Ok to shower with dressing in place but do not submerge in bath, pool or hot tub until incision is completely healed.     FOLLOW-UP APPOINTMENT:  A follow-up appointment should be arranged with your doctor in 1-2 weeks; call to schedule.    You should CALL YOUR PHYSICIAN if you develop:  Fever greater than 101 degrees F.  Pain not relieved by medication, or persistent nausea or vomiting.  Excessive bleeding (blood soaking through dressing) or unexpected drainage from the wound.  Extreme redness or swelling around the incision site, drainage of pus or foul smelling drainage.  Inability to urinate or empty your bladder within 8 hours.  Problems with breathing or chest pain.    You should call 911 if you develop problems with breathing or chest pain.  If you are unable to contact your doctor or surgical center, you should go to the nearest emergency room or urgent care center.  Physician's telephone #: [phone]    If any questions arise, call your doctor.  If your doctor is not available, please feel free to call the Surgical Center at [phone].  The Center is open Monday through Friday from 7AM to 7PM.  You can also call the [email or web address] HOTLINE open 24 hours/day, 7 days/week and speak to a nurse at [phone], or toll free at [phone]  [phone].    A registered nurse may call you a few days after your surgery to see how you are doing after your procedure.    MEDICATIONS: Resume taking daily medication.  Take prescribed pain medication with food.  If no medication is prescribed, you may take non-aspirin pain medication if needed.  PAIN MEDICATION CAN BE VERY CONSTIPATING.  Take a stool softener or laxative such as senokot, pericolace, or milk of magnesia if needed.    Prescription given for Norco and robaxin.  Last pain medication given at 09:45.    If your physician has prescribed pain medication that includes Acetaminophen (Tylenol), do not take additional Acetaminophen (Tylenol) while taking the prescribed medication.    Depression / Suicide Risk    As you are discharged from this [facility] facility, it is important to learn how to keep safe from harming yourself.    Recognize the warning signs:  · Abrupt changes in personality, positive or negative- including increase in energy   · Giving away possessions  · Change in eating patterns- significant weight changes-  positive or negative  · Change in sleeping patterns- unable to sleep or sleeping all the time   · Unwillingness or inability to communicate  · Depression  · Unusual sadness, discouragement and loneliness  · Talk of wanting to die  · Neglect of personal appearance   · Rebelliousness- reckless behavior  · Withdrawal from people/activities they love  · Confusion- inability to concentrate     If you or a loved one observes any of these behaviors or has concerns about self-harm, here's what you can do:  · Talk about it- your feelings and reasons for harming yourself  · Remove any means that you might use to hurt yourself (examples: pills, rope, extension cords, firearm)  · Get professional help from the community (Mental Health, Substance Abuse, psychological counseling)  · Do not be alone:Call your Safe Contact- someone whom you trust who will be there for you.  · Call your local  CRISIS HOTLINE 330-8614 or 595-256-4382  · Call your local Children's Mobile Crisis Response Team [place] (172) 690-6944 or www.[employer]oMobi  · Call the toll free National Suicide Prevention Hotlines   · National Suicide Prevention Lifeline 040-305-KBWS (9517)  · National [employer] Line Network 800-SUICIDE (062-9777)

## 2018-02-17 NOTE — THERAPY
"81 y/o male adm for lumbar stenosis s/p MIS right L5-S1 laminotomy/foraminotomy.Pt reporting that RLE pain is gone during mobility today. Intact motor and sensory components BLE. Intact balance with level ground ambulation with no AD. No further acute PT services required at this time.    Physical Therapy Evaluation completed.   Bed Mobility:  Supine to Sit: Supervised  Transfers: Sit to Stand: Supervised  Gait: Level Of Assist: Supervised with No Equipment Needed       Plan of Care: Patient with no further skilled PT needs in the acute care setting at this time  Discharge Recommendations: Equipment: No Equipment Needed. Post-acute therapy Discharge to home with outpatient or home health for additional skilled therapy services.    See \"Rehab Therapy-Acute\" Patient Summary Report for complete documentation.     "

## 2018-02-17 NOTE — PROGRESS NOTES
Neurosurgery Progress Note    Subjective:  POD#1  MIS Right L5 and S1 Laminotomy and Foraminotomy  Surgery went well and the pt states legs much better.  Back pain toleratable.  William in place overnight.  Will D/c Now and initiate Bladder scan Protocol.    Patient started on Flomax.    Will see if pt can urinate by himself and D/c this afternoon.       Exam:  Wound C/D/I  Abdomen soft NT/ND  Feeling better since cath in place.  Moving all ext well      BP  Min: 132/57  Max: 165/55  Pulse  Av.6  Min: 49  Max: 85  Resp  Avg: 15.9  Min: 11  Max: 18  Temp  Av.3 °C (97.4 °F)  Min: 36.2 °C (97.1 °F)  Max: 36.6 °C (97.8 °F)  SpO2  Av.4 %  Min: 93 %  Max: 99 %    No Data Recorded        No results for input(s): SODIUM, POTASSIUM, CHLORIDE, CO2, GLUCOSE, BUN, CPKTOTAL in the last 72 hours.            Intake/Output       18 0700 - 18 0659 18 0700 - 18 0659      [id number] 6305-0336 Total 8924-9566 2088-1176 Total       Intake    I.V.  900  -- 900  --  -- --    Crystalloid Intake 900 -- 900 -- -- --    Total Intake 900 -- 900 -- -- --       Output    Urine  --  2275 2275  --  -- --    Number of Times Voided 2 x -- 2 x -- -- --    Indwelling Cathether -- 227 2275 -- -- --    Blood  50  -- 50  --  -- --    Est. Blood Loss (mL) 50 -- 50 -- -- --    Total Output 50 2275 2325 -- -- --       Net I/O     850 -2275 -1425 -- -- --            Intake/Output Summary (Last 24 hours) at 18 0737  Last data filed at 18 0400   Gross per 24 hour   Intake              900 ml   Output             2325 ml   Net            -1425 ml            • NS   Continuous   • zolpidem  5 mg HS PRN   • timolol  1 Drop Q EVENING   • simvastatin  20 mg Q EVENING   • metoprolol SR  25 mg DAILY   • losartan  50 mg DAILY   • hydroCHLOROthiazide  12.5 mg DAILY   • ascorbic acid  500 mg DAILY   • amLODIPine  5 mg DAILY   • Pharmacy Consult Request  1 Each PRN   • MD ALERT...Do not administer NSAIDS or ASPIRIN unless  ORDERED By Neurosurgery  1 Each PRN   • docusate sodium  100 mg BID   • senna-docusate  1 Tab Nightly   • senna-docusate  1 Tab Q24HRS PRN   • polyethylene glycol/lytes  1 Packet BID PRN   • magnesium hydroxide  30 mL QDAY PRN   • bisacodyl  10 mg Q24HRS PRN   • fleet  1 Each Once PRN   • NS   Continuous   • enoxaparin  40 mg DAILY   • oxyCODONE immediate-release  2.5 mg Q3HRS PRN   • oxyCODONE immediate-release  5 mg Q3HRS PRN   • HYDROmorphone  0.25 mg Q3HRS PRN   • ondansetron  4 mg Q4HRS PRN   • ondansetron  4 mg Q4HRS PRN   • diphenhydrAMINE  25 mg Q6HRS PRN    Or   • diphenhydrAMINE  25 mg Q6HRS PRN   • tizanidine  2 mg TID PRN   • vitamin D  5,000 Units DAILY   • calcium carbonate  500 mg BID   • benzocaine-menthol  1 Lozenge Q2HRS PRN   • tamsulosin  0.4 mg AFTER BREAKFAST       Assessment and Plan:  POD #1  MIS Right L5-and Si laminotomy and foraminotomy.  Ruiz removed this Am.   Will d/c home today  on flomax x q1 week iftolerating ruiz out. Will Start Pain meds/Flomax and consult Urology for f/u Monday or Tuesday if ruiz needs replacement today.  Legs and back better.   Admitted to 23 hour obs.

## 2018-02-17 NOTE — PROGRESS NOTES
Pt is discharged to home. IV and ruiz have been discontinued. Prescriptions were not given to patient as they received them yesterday and had them filled yesterday. Pt was instructed on how to care for his wound and signs and symptoms to look for in case of infection.

## 2018-02-17 NOTE — PROGRESS NOTES
Receive pt from PACU. Pt A&Ox4. Pt ambulated to bed. Two RN skin check complete, no signs of skin breakdown.

## 2018-04-25 DIAGNOSIS — E78.5 DYSLIPIDEMIA: ICD-10-CM

## 2018-04-25 DIAGNOSIS — E78.00 PURE HYPERCHOLESTEROLEMIA: ICD-10-CM

## 2018-04-25 RX ORDER — SIMVASTATIN 20 MG
20 TABLET ORAL EVERY EVENING
Qty: 90 TAB | Refills: 2 | Status: SHIPPED | OUTPATIENT
Start: 2018-04-25 | End: 2018-12-14

## 2018-07-17 DIAGNOSIS — I10 ESSENTIAL HYPERTENSION, BENIGN: ICD-10-CM

## 2018-07-17 RX ORDER — METOPROLOL SUCCINATE 25 MG/1
25 TABLET, EXTENDED RELEASE ORAL DAILY
Qty: 90 TAB | Refills: 0 | Status: SHIPPED | OUTPATIENT
Start: 2018-07-17 | End: 2018-12-14 | Stop reason: SDUPTHER

## 2018-08-22 ENCOUNTER — TELEPHONE (OUTPATIENT)
Dept: CARDIOLOGY | Facility: MEDICAL CENTER | Age: 83
End: 2018-08-22

## 2018-08-22 NOTE — TELEPHONE ENCOUNTER
Patient was called twice and scheduling left messages asking pt to call back and schedule. No call back received, letter sent to patient.

## 2018-10-04 DIAGNOSIS — I10 ESSENTIAL HYPERTENSION, BENIGN: ICD-10-CM

## 2018-10-04 RX ORDER — AMLODIPINE BESYLATE 5 MG/1
5 TABLET ORAL DAILY
Qty: 90 TAB | Refills: 0 | Status: SHIPPED | OUTPATIENT
Start: 2018-10-04 | End: 2018-12-14 | Stop reason: SDUPTHER

## 2018-10-04 RX ORDER — LOSARTAN POTASSIUM 50 MG/1
50 TABLET ORAL DAILY
Qty: 90 TAB | Refills: 0 | Status: SHIPPED | OUTPATIENT
Start: 2018-10-04 | End: 2018-12-14 | Stop reason: SDUPTHER

## 2018-12-14 ENCOUNTER — OFFICE VISIT (OUTPATIENT)
Dept: CARDIOLOGY | Facility: CLINIC | Age: 83
End: 2018-12-14
Payer: MEDICARE

## 2018-12-14 VITALS
DIASTOLIC BLOOD PRESSURE: 60 MMHG | BODY MASS INDEX: 25.05 KG/M2 | OXYGEN SATURATION: 96 % | HEIGHT: 70 IN | SYSTOLIC BLOOD PRESSURE: 130 MMHG | HEART RATE: 54 BPM | WEIGHT: 175 LBS

## 2018-12-14 DIAGNOSIS — I25.10 CORONARY ARTERY DISEASE DUE TO CALCIFIED CORONARY LESION: ICD-10-CM

## 2018-12-14 DIAGNOSIS — Q21.0 VSD (VENTRICULAR SEPTAL DEFECT): ICD-10-CM

## 2018-12-14 DIAGNOSIS — I65.23 BILATERAL CAROTID ARTERY STENOSIS: Chronic | ICD-10-CM

## 2018-12-14 DIAGNOSIS — I25.84 CORONARY ARTERY DISEASE DUE TO CALCIFIED CORONARY LESION: ICD-10-CM

## 2018-12-14 DIAGNOSIS — I50.22 CHF (CONGESTIVE HEART FAILURE), NYHA CLASS II, CHRONIC, SYSTOLIC (HCC): Chronic | ICD-10-CM

## 2018-12-14 DIAGNOSIS — Z95.1 S/P CABG X 1: ICD-10-CM

## 2018-12-14 DIAGNOSIS — I25.5 ISCHEMIC CARDIOMYOPATHY: Chronic | ICD-10-CM

## 2018-12-14 DIAGNOSIS — I10 ESSENTIAL HYPERTENSION: ICD-10-CM

## 2018-12-14 DIAGNOSIS — I10 ESSENTIAL HYPERTENSION, BENIGN: ICD-10-CM

## 2018-12-14 DIAGNOSIS — I25.2 PREVIOUS INFERIOR MYOCARDIAL INFARCTION OLDER THAN 8 WEEKS: Chronic | ICD-10-CM

## 2018-12-14 PROBLEM — Z79.899 HIGH RISK MEDICATION USE: Status: RESOLVED | Noted: 2017-01-12 | Resolved: 2018-12-14

## 2018-12-14 PROCEDURE — 99214 OFFICE O/P EST MOD 30 MIN: CPT | Performed by: INTERNAL MEDICINE

## 2018-12-14 RX ORDER — DOXAZOSIN MESYLATE 4 MG/1
4 TABLET ORAL DAILY
Qty: 90 TAB | Refills: 3 | Status: SHIPPED | OUTPATIENT
Start: 2018-12-14

## 2018-12-14 RX ORDER — HYDROCHLOROTHIAZIDE 12.5 MG/1
12.5 TABLET ORAL DAILY
Qty: 90 TAB | Refills: 3 | Status: SHIPPED | OUTPATIENT
Start: 2018-12-14 | End: 2020-01-13 | Stop reason: SDUPTHER

## 2018-12-14 RX ORDER — LOSARTAN POTASSIUM 50 MG/1
50 TABLET ORAL DAILY
Qty: 90 TAB | Refills: 3 | Status: SHIPPED | OUTPATIENT
Start: 2018-12-14 | End: 2020-01-13 | Stop reason: SDUPTHER

## 2018-12-14 RX ORDER — DOXAZOSIN MESYLATE 4 MG/1
4 TABLET ORAL DAILY
COMMUNITY
End: 2018-12-14 | Stop reason: SDUPTHER

## 2018-12-14 RX ORDER — ROSUVASTATIN CALCIUM 20 MG/1
20 TABLET, COATED ORAL EVERY EVENING
Qty: 90 TAB | Refills: 3 | Status: SHIPPED | OUTPATIENT
Start: 2018-12-14 | End: 2020-01-24 | Stop reason: SDUPTHER

## 2018-12-14 RX ORDER — AMLODIPINE BESYLATE 5 MG/1
5 TABLET ORAL DAILY
Qty: 90 TAB | Refills: 3 | Status: SHIPPED | OUTPATIENT
Start: 2018-12-14 | End: 2020-01-13 | Stop reason: SDUPTHER

## 2018-12-14 RX ORDER — METOPROLOL SUCCINATE 25 MG/1
25 TABLET, EXTENDED RELEASE ORAL DAILY
Qty: 90 TAB | Refills: 3 | Status: SHIPPED | OUTPATIENT
Start: 2018-12-14 | End: 2019-05-14 | Stop reason: SDUPTHER

## 2018-12-14 ASSESSMENT — ENCOUNTER SYMPTOMS
FEVER: 0
SORE THROAT: 0
WEAKNESS: 0
DIZZINESS: 0
FOCAL WEAKNESS: 0
PND: 0
NAUSEA: 0
COUGH: 0
BLURRED VISION: 0
CLAUDICATION: 0
FALLS: 0
PALPITATIONS: 0
CHILLS: 0
SHORTNESS OF BREATH: 0
BRUISES/BLEEDS EASILY: 0
ABDOMINAL PAIN: 0

## 2018-12-14 NOTE — LETTER
[facility] [place] [address],   2nd [address]  [place] [address]  Phone: [phone]  Fax: [phone]              William Salamanca  1935    Encounter Date: 12/14/2018    Aj Washington M.D.          PROGRESS NOTE:  Chief Complaint   Patient presents with   • HTN (Controlled)       Subjective:   William Salamanca is a 83 y.o. male who presents today For follow-up of his history of ischemic cardia myopathy having had a late presenting inferior MI in 2013 which is complicated by a VSD he had CABG to the PDA next    He had cardiac testing this year prior to back surgery which showed EF of 45% and stress test showed inferior wall scar his carotid shows moderate carotid disease     Has been doing well tolerating his medications well    He went through back surgery without complication    Past Medical History:   Diagnosis Date   • CHF (congestive heart failure), NYHA class II, chronic, systolic (HCC) EF 45% 2018    • Diabetes    • Hypertension    • Ischemic cardiomyopathy - EF 45% 2018    • Previous inferior myocardial infarction 2013 - complicated by VSD s/p CABG and VSD repair 5/31/2013   • S/P CABG x 1 7/19/2013    SVBG-PDA, 3/29/2013, Dr. Escalona    • VSD (ventricular septal defect) 3/27/2013    [place] Jem patch 3/29/2013, Dr. Escalona    • VSD (ventricular septal defect) s/p repair 3/27/2013    [place] Jem patch 3/29/2013, Dr. Escalona      Past Surgical History:   Procedure Laterality Date   • LUMBAR LAMINECTOMY DISKECTOMY Right 2/16/2018    Procedure: LUMBAR LAMINECTOMY DISKECTOMY- L5-S1;  Surgeon: Celestino aBll M.D.;  Location: SURGERY [facility];  Service: Neurosurgery   • FORAMINOTOMY  2/16/2018    Procedure: FORAMINOTOMY;  Surgeon: Celestino Ball M.D.;  Location: SURGERY [facility];  Service: Neurosurgery   • TOE AMPUTATION  4/16/2013    Performed by Khang Orlando M.D. at [facility]   •  VENTRAL SEPTAL DEFECT REPAIR  3/29/2013    Performed by Miesha Escalona M.D. at SURGERY [facility]   • MULTIPLE CORONARY ARTERY BYPASS ENDO VEIN HARVEST  3/29/2013    Performed by Miesha Escalona M.D. at SURGERY [facility]     Family History   Problem Relation Age of Onset   • Heart Disease Father 77         during heart surgery     Social History     Social History   • Marital status:      Spouse name: N/A   • Number of children: N/A   • Years of education: N/A     Occupational History   • Not on file.     Social History Main Topics   • Smoking status: Former Smoker     Quit date: 1980   • Smokeless tobacco: Never Used   • Alcohol use No   • Drug use: No   • Sexual activity: Not on file     Other Topics Concern   • Not on file     Social History Narrative   • No narrative on file     Allergies   Allergen Reactions   • Morphine Sulfate Vomiting     [id number]     Outpatient Encounter Prescriptions as of 2018   Medication Sig Dispense Refill   • rosuvastatin (CRESTOR) 20 MG Tab Take 1 Tab by mouth every evening. 90 Tab 3   • doxazosin (CARDURA) 4 MG Tab Take 1 Tab by mouth every day. 90 Tab 3   • amLODIPine (NORVASC) 5 MG Tab Take 1 Tab by mouth every day. 90 Tab 3   • losartan (COZAAR) 50 MG Tab Take 1 Tab by mouth every day. 90 Tab 3   • metoprolol SR (TOPROL XL) 25 MG TABLET SR 24 HR Take 1 Tab by mouth every day. 90 Tab 3   • hydroCHLOROthiazide (HYDRODIURIL) 12.5 MG tablet Take 1 Tab by mouth every day. 90 Tab 3   • methocarbamol (ROBAXIN) 750 MG Tab Take 1 Tab by mouth 3 times a day. 90 Tab 1   • ibuprofen (MOTRIN) 200 MG Tab Take 200 mg by mouth every 8 hours as needed.     • hydrocodone-acetaminophen (NORCO) 5-325 MG Tab per tablet Take 1-2 Tabs by mouth every four hours as needed.     • aspirin (ASA) 81 MG Chew Tab chewable tablet Take 1 Tab by mouth every day. 100 Tab 6   • zolpidem (AMBIEN) 10 MG TABS Take 5 mg by mouth at bedtime as needed.     • Multiple Vitamin (MULTI  VITAMIN MENS) TABS Take 1 Tab by mouth every day.     • docosahexanoic acid (OMEGA 3 FA) 1000 MG CAPS Take 1,000 mg by mouth 3 times a day, with meals.     • ascorbic acid (ASCORBIC ACID) 500 MG TABS Take 500 mg by mouth every day.     • Calcium Carb-Cholecalciferol (CALCIUM 1000 + D PO) Take 1 Tab by mouth every day.     • B Complex-Folic Acid (B COMPLEX-VITAMIN B12 PO) Take 1 Tab by mouth every day.     • timolol (TIMOPTIC) 0.5 % SOLN Place 1 Drop in both eyes every evening.     • [DISCONTINUED] doxazosin (CARDURA) 4 MG Tab Take 4 mg by mouth every day.     • [DISCONTINUED] amLODIPine (NORVASC) 5 MG Tab Take 1 Tab by mouth every day. For further refills please contact new cardiologist. Thank you 90 Tab 0   • [DISCONTINUED] losartan (COZAAR) 50 MG Tab Take 1 Tab by mouth every day. For further refills please contact new cardiologist. Thank you 90 Tab 0   • [DISCONTINUED] metoprolol SR (TOPROL XL) 25 MG TABLET SR 24 HR Take 1 Tab by mouth every day. For further refills please contact new cardiologist. Thank you 90 Tab 0   • [DISCONTINUED] simvastatin (ZOCOR) 20 MG Tab Take 1 Tab by mouth every evening. For further refills please contact new cardiologist. Thank you 90 Tab 2   • methocarbamol (ROBAXIN) 750 MG Tab Take 1 Tab by mouth 3 times a day as needed. 90 Tab 0   • methocarbamol (ROBAXIN) 750 MG Tab Take 1 Tab by mouth 3 times a day as needed. 60 Tab 0   • methocarbamol (ROBAXIN) 750 MG Tab Take 1 Tab by mouth 3 times a day as needed. 60 Tab 0   • [DISCONTINUED] hydrochlorothiazide (HYDRODIURIL) 12.5 MG tablet Take 12.5 mg by mouth every day.       No facility-administered encounter medications on file as of 12/14/2018.      Review of Systems   Constitutional: Negative for chills and fever.   HENT: Negative for sore throat.    Eyes: Negative for blurred vision.   Respiratory: Negative for cough and shortness of breath.    Cardiovascular: Negative for chest pain, palpitations, claudication, leg swelling and PND.    "  Gastrointestinal: Negative for abdominal pain and nausea.   Musculoskeletal: Negative for falls and joint pain.   Skin: Negative for rash.   Neurological: Negative for dizziness, focal weakness and weakness.   Endo/Heme/Allergies: Does not bruise/bleed easily.        Objective:   /60 (BP Location: Right arm, Patient Position: Sitting)   Pulse (!) 54   Ht 1.778 m (5' 10\")   Wt 79.4 kg (175 lb)   SpO2 96%   BMI 25.11 kg/m²      Physical Exam   Constitutional: No distress.   HENT:   Mouth/Throat: Oropharynx is clear and moist. No oropharyngeal exudate.   Eyes: No scleral icterus.   Neck: No JVD present.   Cardiovascular: Normal rate and normal heart sounds.  Exam reveals no gallop and no friction rub.    No murmur heard.  Remarkable bruit bilaterally   Pulmonary/Chest: No respiratory distress. He has no wheezes. He has no rales.   Abdominal: Soft. Bowel sounds are normal.   Musculoskeletal: He exhibits no edema.   Neurological: He is alert.   Skin: No rash noted. He is not diaphoretic.   Psychiatric: He has a normal mood and affect.     His testing from the spring was reviewed    His lipid panel from January shows well-controlled dyslipidemia LDL in the 50s  Assessment:     1. Coronary artery disease due to calcified coronary lesion     2. S/P CABG x 1     3. VSD (ventricular septal defect)     4. Essential hypertension     5. Ischemic cardiomyopathy - EF 45% 2018     6. CHF (congestive heart failure), NYHA class II, chronic, systolic (HCC) EF 45% 2018     7. Previous inferior myocardial infarction 2013 - complicated by VSD s/p CABG and VSD repair     8. Essential hypertension, benign  amLODIPine (NORVASC) 5 MG Tab    losartan (COZAAR) 50 MG Tab    metoprolol SR (TOPROL XL) 25 MG TABLET SR 24 HR       Medical Decision Making:  Today's Assessment / Status / Plan:     It was my pleasure to meet with Mr. Salamanca.    I renewed his heart medications    Given his moderate carotid disease like to maximize a " statin so we switched to rosuvastatin instead of simvastatin    Blood pressure is well controlled.      We will repeat his carotid ultrasound in the spring 2020    I will see Mr. Salamanca back in a little over 1 year time in the spring 2020 and encouraged him to follow up with us over the phone or e-mail using my MyChart as issues arise.    It is my pleasure to participate in the care of Mr. Salamanca.  Please do not hesitate to contact me with questions or concerns.    Aj Washington MD PhD [facility]  Cardiologist [facility]        Ze Tamayo M.D.  [address] [place] [id number]  VIA Facsimile: [phone]

## 2018-12-14 NOTE — PROGRESS NOTES
Chief Complaint   Patient presents with   • HTN (Controlled)       Subjective:   William Salamanca is a 83 y.o. male who presents today For follow-up of his history of ischemic cardia myopathy having had a late presenting inferior MI in 2013 which is complicated by a VSD he had CABG to the PDA next    He had cardiac testing this year prior to back surgery which showed EF of 45% and stress test showed inferior wall scar his carotid shows moderate carotid disease     Has been doing well tolerating his medications well    He went through back surgery without complication    Past Medical History:   Diagnosis Date   • CHF (congestive heart failure), NYHA class II, chronic, systolic (HCC) EF 45% 2018    • Diabetes    • Hypertension    • Ischemic cardiomyopathy - EF 45% 2018    • Previous inferior myocardial infarction 2013 - complicated by VSD s/p CABG and VSD repair 5/31/2013   • S/P CABG x 1 7/19/2013    SVBG-PDA, 3/29/2013, Dr. Escalona    • VSD (ventricular septal defect) 3/27/2013    [place] Jem patch 3/29/2013, Dr. Escalona    • VSD (ventricular septal defect) s/p repair 3/27/2013    [place] Jem patch 3/29/2013, Dr. Escalona      Past Surgical History:   Procedure Laterality Date   • LUMBAR LAMINECTOMY DISKECTOMY Right 2/16/2018    Procedure: LUMBAR LAMINECTOMY DISKECTOMY- L5-S1;  Surgeon: Celestino Ball M.D.;  Location: [facility];  Service: Neurosurgery   • FORAMINOTOMY  2/16/2018    Procedure: FORAMINOTOMY;  Surgeon: Celestino Ball M.D.;  Location: [facility];  Service: Neurosurgery   • TOE AMPUTATION  4/16/2013    Performed by Khang Orlando M.D. at [facility]   • VENTRAL SEPTAL DEFECT REPAIR  3/29/2013    Performed by Miesha Escalona M.D. at [facility]   • MULTIPLE CORONARY ARTERY BYPASS ENDO VEIN HARVEST  3/29/2013    Performed by Miesha Escalona M.D. at [facility]     Family History   Problem Relation Age of Onset   • Heart Disease  Father 77         during heart surgery     Social History     Social History   • Marital status:      Spouse name: N/A   • Number of children: N/A   • Years of education: N/A     Occupational History   • Not on file.     Social History Main Topics   • Smoking status: Former Smoker     Quit date: 1980   • Smokeless tobacco: Never Used   • Alcohol use No   • Drug use: No   • Sexual activity: Not on file     Other Topics Concern   • Not on file     Social History Narrative   • No narrative on file     Allergies   Allergen Reactions   • Morphine Sulfate Vomiting     [id number]     Outpatient Encounter Prescriptions as of 2018   Medication Sig Dispense Refill   • rosuvastatin (CRESTOR) 20 MG Tab Take 1 Tab by mouth every evening. 90 Tab 3   • doxazosin (CARDURA) 4 MG Tab Take 1 Tab by mouth every day. 90 Tab 3   • amLODIPine (NORVASC) 5 MG Tab Take 1 Tab by mouth every day. 90 Tab 3   • losartan (COZAAR) 50 MG Tab Take 1 Tab by mouth every day. 90 Tab 3   • metoprolol SR (TOPROL XL) 25 MG TABLET SR 24 HR Take 1 Tab by mouth every day. 90 Tab 3   • hydroCHLOROthiazide (HYDRODIURIL) 12.5 MG tablet Take 1 Tab by mouth every day. 90 Tab 3   • methocarbamol (ROBAXIN) 750 MG Tab Take 1 Tab by mouth 3 times a day. 90 Tab 1   • ibuprofen (MOTRIN) 200 MG Tab Take 200 mg by mouth every 8 hours as needed.     • hydrocodone-acetaminophen (NORCO) 5-325 MG Tab per tablet Take 1-2 Tabs by mouth every four hours as needed.     • aspirin (ASA) 81 MG Chew Tab chewable tablet Take 1 Tab by mouth every day. 100 Tab 6   • zolpidem (AMBIEN) 10 MG TABS Take 5 mg by mouth at bedtime as needed.     • Multiple Vitamin (MULTI VITAMIN MENS) TABS Take 1 Tab by mouth every day.     • docosahexanoic acid (OMEGA 3 FA) 1000 MG CAPS Take 1,000 mg by mouth 3 times a day, with meals.     • ascorbic acid (ASCORBIC ACID) 500 MG TABS Take 500 mg by mouth every day.     • Calcium Carb-Cholecalciferol (CALCIUM 1000 + D PO) Take 1 Tab by  mouth every day.     • B Complex-Folic Acid (B COMPLEX-VITAMIN B12 PO) Take 1 Tab by mouth every day.     • timolol (TIMOPTIC) 0.5 % SOLN Place 1 Drop in both eyes every evening.     • [DISCONTINUED] doxazosin (CARDURA) 4 MG Tab Take 4 mg by mouth every day.     • [DISCONTINUED] amLODIPine (NORVASC) 5 MG Tab Take 1 Tab by mouth every day. For further refills please contact new cardiologist. Thank you 90 Tab 0   • [DISCONTINUED] losartan (COZAAR) 50 MG Tab Take 1 Tab by mouth every day. For further refills please contact new cardiologist. Thank you 90 Tab 0   • [DISCONTINUED] metoprolol SR (TOPROL XL) 25 MG TABLET SR 24 HR Take 1 Tab by mouth every day. For further refills please contact new cardiologist. Thank you 90 Tab 0   • [DISCONTINUED] simvastatin (ZOCOR) 20 MG Tab Take 1 Tab by mouth every evening. For further refills please contact new cardiologist. Thank you 90 Tab 2   • methocarbamol (ROBAXIN) 750 MG Tab Take 1 Tab by mouth 3 times a day as needed. 90 Tab 0   • methocarbamol (ROBAXIN) 750 MG Tab Take 1 Tab by mouth 3 times a day as needed. 60 Tab 0   • methocarbamol (ROBAXIN) 750 MG Tab Take 1 Tab by mouth 3 times a day as needed. 60 Tab 0   • [DISCONTINUED] hydrochlorothiazide (HYDRODIURIL) 12.5 MG tablet Take 12.5 mg by mouth every day.       No facility-administered encounter medications on file as of 12/14/2018.      Review of Systems   Constitutional: Negative for chills and fever.   HENT: Negative for sore throat.    Eyes: Negative for blurred vision.   Respiratory: Negative for cough and shortness of breath.    Cardiovascular: Negative for chest pain, palpitations, claudication, leg swelling and PND.   Gastrointestinal: Negative for abdominal pain and nausea.   Musculoskeletal: Negative for falls and joint pain.   Skin: Negative for rash.   Neurological: Negative for dizziness, focal weakness and weakness.   Endo/Heme/Allergies: Does not bruise/bleed easily.        Objective:   /60 (BP  "Location: Right arm, Patient Position: Sitting)   Pulse (!) 54   Ht 1.778 m (5' 10\")   Wt 79.4 kg (175 lb)   SpO2 96%   BMI 25.11 kg/m²      Physical Exam   Constitutional: No distress.   HENT:   Mouth/Throat: Oropharynx is clear and moist. No oropharyngeal exudate.   Eyes: No scleral icterus.   Neck: No JVD present.   Cardiovascular: Normal rate and normal heart sounds.  Exam reveals no gallop and no friction rub.    No murmur heard.  Remarkable bruit bilaterally   Pulmonary/Chest: No respiratory distress. He has no wheezes. He has no rales.   Abdominal: Soft. Bowel sounds are normal.   Musculoskeletal: He exhibits no edema.   Neurological: He is alert.   Skin: No rash noted. He is not diaphoretic.   Psychiatric: He has a normal mood and affect.     His testing from the spring was reviewed    His lipid panel from January shows well-controlled dyslipidemia LDL in the 50s  Assessment:     1. Coronary artery disease due to calcified coronary lesion     2. S/P CABG x 1     3. VSD (ventricular septal defect)     4. Essential hypertension     5. Ischemic cardiomyopathy - EF 45% 2018     6. CHF (congestive heart failure), NYHA class II, chronic, systolic (HCC) EF 45% 2018     7. Previous inferior myocardial infarction 2013 - complicated by VSD s/p CABG and VSD repair     8. Essential hypertension, benign  amLODIPine (NORVASC) 5 MG Tab    losartan (COZAAR) 50 MG Tab    metoprolol SR (TOPROL XL) 25 MG TABLET SR 24 HR       Medical Decision Making:  Today's Assessment / Status / Plan:     It was my pleasure to meet with Mr. Salamanca.    I renewed his heart medications    Given his moderate carotid disease like to maximize a statin so we switched to rosuvastatin instead of simvastatin    Blood pressure is well controlled.      We will repeat his carotid ultrasound in the spring 2020    I will see Mr. Salamanca back in a little over 1 year time in the spring 2020 and encouraged him to follow up with us over the phone or " e-mail using my MyChart as issues arise.    It is my pleasure to participate in the care of Mr. Salamanca.  Please do not hesitate to contact me with questions or concerns.    Aj Washington MD PhD FAC  Cardiologist [facility]

## 2019-03-28 ENCOUNTER — OFFICE VISIT (OUTPATIENT)
Dept: CARDIOLOGY | Facility: CLINIC | Age: 84
End: 2019-03-28
Payer: MEDICARE

## 2019-03-28 VITALS
HEART RATE: 52 BPM | HEIGHT: 70 IN | WEIGHT: 174 LBS | SYSTOLIC BLOOD PRESSURE: 142 MMHG | DIASTOLIC BLOOD PRESSURE: 66 MMHG | BODY MASS INDEX: 24.91 KG/M2 | OXYGEN SATURATION: 90 %

## 2019-03-28 DIAGNOSIS — I65.23 BILATERAL CAROTID ARTERY STENOSIS: Chronic | ICD-10-CM

## 2019-03-28 DIAGNOSIS — I25.5 ISCHEMIC CARDIOMYOPATHY: Chronic | ICD-10-CM

## 2019-03-28 DIAGNOSIS — I25.84 CORONARY ARTERY DISEASE DUE TO CALCIFIED CORONARY LESION: ICD-10-CM

## 2019-03-28 DIAGNOSIS — I25.10 CORONARY ARTERY DISEASE DUE TO CALCIFIED CORONARY LESION: ICD-10-CM

## 2019-03-28 DIAGNOSIS — Z95.1 S/P CABG X 1: ICD-10-CM

## 2019-03-28 DIAGNOSIS — Q21.0 VSD (VENTRICULAR SEPTAL DEFECT): Chronic | ICD-10-CM

## 2019-03-28 DIAGNOSIS — I25.2 PREVIOUS INFERIOR MYOCARDIAL INFARCTION OLDER THAN 8 WEEKS: Chronic | ICD-10-CM

## 2019-03-28 DIAGNOSIS — I50.22 CHF (CONGESTIVE HEART FAILURE), NYHA CLASS II, CHRONIC, SYSTOLIC (HCC): Chronic | ICD-10-CM

## 2019-03-28 DIAGNOSIS — I63.10 CEREBROVASCULAR ACCIDENT (CVA) DUE TO EMBOLISM OF PRECEREBRAL ARTERY (HCC): ICD-10-CM

## 2019-03-28 LAB — EKG IMPRESSION: NORMAL

## 2019-03-28 PROCEDURE — 93000 ELECTROCARDIOGRAM COMPLETE: CPT | Performed by: INTERNAL MEDICINE

## 2019-03-28 PROCEDURE — 99214 OFFICE O/P EST MOD 30 MIN: CPT | Performed by: INTERNAL MEDICINE

## 2019-03-28 RX ORDER — CLOPIDOGREL BISULFATE 75 MG/1
75 TABLET ORAL DAILY
Qty: 90 TAB | Refills: 3 | Status: SHIPPED | OUTPATIENT
Start: 2019-03-28 | End: 2019-10-03

## 2019-03-28 RX ORDER — ASPIRIN 325 MG
325 TABLET ORAL EVERY 6 HOURS PRN
COMMUNITY
End: 2019-03-28

## 2019-03-28 NOTE — LETTER
[facility] [address],   [address]nd [address]  [place] [address]  Phone: [phone]  Fax: [phone]              William Salamanca  1935    Encounter Date: 3/28/2019    Aj Washington M.D.          PROGRESS NOTE:  Chief Complaint   Patient presents with   • Coronary Artery Disease       Subjective:   William Salamanca is a 84 y.o. male who presents today for follow-up of his history of ischemic heart myopathy which was complicated by a VSD about 6 years ago    There was a concern for some neurologic symptoms    He had a repeat echocardiogram and other appropriate testing in [place]    Past Medical History:   Diagnosis Date   • Bilateral carotid artery stenosis moderate 2018    • CHF (congestive heart failure), NYHA class II, chronic, systolic (HCC) EF 45% 2018    • Diabetes    • Hypertension    • Ischemic cardiomyopathy - EF 45% 2018    • Previous inferior myocardial infarction 2013 - complicated by VSD s/p CABG and VSD repair 5/31/2013   • S/P CABG x 1 7/19/2013    SVBG-PDA, 3/29/2013, Dr. Escalona    • VSD (ventricular septal defect) 3/27/2013    [place] Jem patch 3/29/2013, Dr. Escalona    • VSD (ventricular septal defect) s/p repair 3/27/2013    [place] Jem patch 3/29/2013, Dr. Escalona      Past Surgical History:   Procedure Laterality Date   • LUMBAR LAMINECTOMY DISKECTOMY Right 2/16/2018    Procedure: LUMBAR LAMINECTOMY DISKECTOMY- L5-S1;  Surgeon: Celestino Ball M.D.;  Location: SURGERY [facility];  Service: Neurosurgery   • FORAMINOTOMY  2/16/2018    Procedure: FORAMINOTOMY;  Surgeon: Celestino Ball M.D.;  Location: [facility];  Service: Neurosurgery   • TOE AMPUTATION  4/16/2013    Performed by Khang Orlando M.D. at [facility]   • VENTRAL SEPTAL DEFECT REPAIR  3/29/2013    Performed by Miesha Escalona M.D. at [facility]   • MULTIPLE CORONARY ARTERY BYPASS ENDO VEIN  HARVEST  3/29/2013    Performed by Miesha Escalona M.D. at SURGERY [facility]     Family History   Problem Relation Age of Onset   • Heart Disease Father 77         during heart surgery     Social History     Social History   • Marital status:      Spouse name: N/A   • Number of children: N/A   • Years of education: N/A     Occupational History   • Not on file.     Social History Main Topics   • Smoking status: Former Smoker     Quit date: 1980   • Smokeless tobacco: Never Used   • Alcohol use No   • Drug use: No   • Sexual activity: Not on file     Other Topics Concern   • Not on file     Social History Narrative   • No narrative on file     Allergies   Allergen Reactions   • Morphine Sulfate Vomiting     [id number]     Outpatient Encounter Prescriptions as of 3/28/2019   Medication Sig Dispense Refill   • clopidogrel (PLAVIX) 75 MG Tab Take 1 Tab by mouth every day. 90 Tab 3   • rosuvastatin (CRESTOR) 20 MG Tab Take 1 Tab by mouth every evening. 90 Tab 3   • doxazosin (CARDURA) 4 MG Tab Take 1 Tab by mouth every day. 90 Tab 3   • amLODIPine (NORVASC) 5 MG Tab Take 1 Tab by mouth every day. 90 Tab 3   • losartan (COZAAR) 50 MG Tab Take 1 Tab by mouth every day. 90 Tab 3   • metoprolol SR (TOPROL XL) 25 MG TABLET SR 24 HR Take 1 Tab by mouth every day. 90 Tab 3   • hydroCHLOROthiazide (HYDRODIURIL) 12.5 MG tablet Take 1 Tab by mouth every day. 90 Tab 3   • methocarbamol (ROBAXIN) 750 MG Tab Take 1 Tab by mouth 3 times a day. 90 Tab 1   • ibuprofen (MOTRIN) 200 MG Tab Take 200 mg by mouth every 8 hours as needed.     • hydrocodone-acetaminophen (NORCO) 5-325 MG Tab per tablet Take 1-2 Tabs by mouth every four hours as needed.     • zolpidem (AMBIEN) 10 MG TABS Take 5 mg by mouth at bedtime as needed.     • Multiple Vitamin (MULTI VITAMIN MENS) TABS Take 1 Tab by mouth every day.     • docosahexanoic acid (OMEGA 3 FA) 1000 MG CAPS Take 1,000 mg by mouth 3 times a day, with meals.     • ascorbic acid  "(ASCORBIC ACID) 500 MG TABS Take 500 mg by mouth every day.     • Calcium Carb-Cholecalciferol (CALCIUM 1000 + D PO) Take 1 Tab by mouth every day.     • B Complex-Folic Acid (B COMPLEX-VITAMIN B12 PO) Take 1 Tab by mouth every day.     • timolol (TIMOPTIC) 0.5 % SOLN Place 1 Drop in both eyes every evening.     • [DISCONTINUED] aspirin (ASA) 325 MG Tab Take 325 mg by mouth every 6 hours as needed.     • [DISCONTINUED] methocarbamol (ROBAXIN) 750 MG Tab Take 1 Tab by mouth 3 times a day as needed. 90 Tab 0   • [DISCONTINUED] methocarbamol (ROBAXIN) 750 MG Tab Take 1 Tab by mouth 3 times a day as needed. 60 Tab 0   • [DISCONTINUED] methocarbamol (ROBAXIN) 750 MG Tab Take 1 Tab by mouth 3 times a day as needed. 60 Tab 0   • [DISCONTINUED] aspirin (ASA) 81 MG Chew Tab chewable tablet Take 1 Tab by mouth every day. 100 Tab 6     No facility-administered encounter medications on file as of 3/28/2019.      Review of Systems   Constitutional: Negative for chills and fever.   HENT: Negative for sore throat.    Eyes: Negative for blurred vision.   Respiratory: Negative for cough and shortness of breath.    Cardiovascular: Negative for chest pain, palpitations, claudication, leg swelling and PND.   Gastrointestinal: Negative for abdominal pain and nausea.   Musculoskeletal: Negative for falls and joint pain.   Skin: Negative for rash.   Neurological: Negative for dizziness, focal weakness and weakness.   Endo/Heme/Allergies: Does not bruise/bleed easily.        Objective:   /66 (BP Location: Right arm, Patient Position: Sitting)   Pulse (!) 52   Ht 1.778 m (5' 10\")   Wt 78.9 kg (174 lb)   SpO2 90%   BMI 24.97 kg/m²      Physical Exam   Constitutional: No distress.   HENT:   Mouth/Throat: Oropharynx is clear and moist. No oropharyngeal exudate.   Eyes: No scleral icterus.   Neck: No JVD present.   Cardiovascular: Normal rate and normal heart sounds.  Exam reveals no gallop and no friction rub.    No murmur " heard.  Pulmonary/Chest: No respiratory distress. He has no wheezes. He has no rales.   Abdominal: Soft. Bowel sounds are normal.   Musculoskeletal: He exhibits no edema.   Neurological: He is alert.   Skin: No rash noted. He is not diaphoretic.   Psychiatric: He has a normal mood and affect.     We reviewed in person the recent labs  Recent Results (from the past 4032 hour(s))   EKG    Collection Time: 19 10:08 AM   Result Value Ref Range    Report       [facility] [place]    Test Date:  2019  Pt Name:    ENMA MARTELL              Department: [facility]  MRN:        [id number]                      Room:  Gender:     Male                         Technician:   :        1935                   Requested By:ARTHUR HAYNES  Order #:    [id number]                    Reading MD: Arthur Haynes MD    Measurements  Intervals                                Axis  Rate:       54                           P:          61  NC:         204                          QRS:        53  QRSD:       108                          T:          246  QT:         480  QTc:        455    Interpretive Statements  SINUS BRADYCARDIA  INFERIOR INFARCT, AGE INDETERMINATE  NONSPECIFIC T ABNORMALITIES, ANT-LAT LEADS  Compared to ECG 2013 03:42:25  NO SIGNIFICANT CHANGES  Electronically Signed On 3- 16:28:56 PDT by Arthur Haynes MD       We reviewed the outside hospital testing    Assessment:     1. Bilateral carotid artery stenosis moderate      2. CHF (congestive heart failure), NYHA class II, chronic, systolic (HCC) EF 45% 2018     3. Coronary artery disease due to calcified coronary lesion  EKG   4. Ischemic cardiomyopathy - EF 45% 2018  EKG   5. Previous inferior myocardial infarction 2013 - complicated by VSD s/p CABG and VSD repair     6. S/P CABG x 1     7. VSD (ventricular septal defect) s/p repair     8. Cerebrovascular accident (CVA) due to embolism of precerebral artery (HCC)   Holter Monitor / Event Recorder    EKG       Medical Decision Making:  Today's Assessment / Status / Plan:     It was my pleasure to meet with Mr. Salamanca.    Blood pressure is well controlled.      He is on appropriate statin.    We should ensure that he does not have an occult arrhythmia which would necessitate stronger anti-coagulant than the Plavix which she was appropriately started on    We will do a 30-day event monitor    I will see Mr. Salamanca back in 6 months time and encouraged him to follow up with us over the phone or e-mail using my CareinSynchart as issues arise.    It is my pleasure to participate in the care of Mr. Saalmanca.  Please do not hesitate to contact me with questions or concerns.    Aj Washington MD PhD [facility]  Cardiologist [facility]    Please note that this dictation was created using voice recognition software. I have worked with consultants from the vendor as well as technical experts from [facility] to optimize the interface. I have made every reasonable attempt to correct obvious errors, but I expect that there are errors of grammar and possibly content I did not discover before finalizing the note.         Ze Tamayo M.D.  [address] [place] [id number]  VIA Facsimile: [phone]

## 2019-03-29 ASSESSMENT — ENCOUNTER SYMPTOMS
FEVER: 0
FOCAL WEAKNESS: 0
SORE THROAT: 0
NAUSEA: 0
SHORTNESS OF BREATH: 0
ABDOMINAL PAIN: 0
CLAUDICATION: 0
PALPITATIONS: 0
COUGH: 0
PND: 0
FALLS: 0
BRUISES/BLEEDS EASILY: 0
CHILLS: 0
BLURRED VISION: 0
DIZZINESS: 0
WEAKNESS: 0

## 2019-03-30 NOTE — PROGRESS NOTES
Chief Complaint   Patient presents with   • Coronary Artery Disease       Subjective:   William Salamanca is a 84 y.o. male who presents today for follow-up of his history of ischemic heart myopathy which was complicated by a VSD about 6 years ago    There was a concern for some neurologic symptoms    He had a repeat echocardiogram and other appropriate testing in [place]    Past Medical History:   Diagnosis Date   • Bilateral carotid artery stenosis moderate     • CHF (congestive heart failure), NYHA class II, chronic, systolic (HCC) EF 45%     • Diabetes    • Hypertension    • Ischemic cardiomyopathy - EF 45%     • Previous inferior myocardial infarction  - complicated by VSD s/p CABG and VSD repair 2013   • S/P CABG x 1 2013    SVBG-PDA, 3/29/2013, Dr. Escalona    • VSD (ventricular septal defect) 3/27/2013    [place] Jem patch 3/29/2013, Dr. Escalona    • VSD (ventricular septal defect) s/p repair 3/27/2013    [place] Jem patch 3/29/2013, Dr. Escalona      Past Surgical History:   Procedure Laterality Date   • LUMBAR LAMINECTOMY DISKECTOMY Right 2018    Procedure: LUMBAR LAMINECTOMY DISKECTOMY- L5-S1;  Surgeon: Celestino Ball M.D.;  Location: SURGERY [facility];  Service: Neurosurgery   • FORAMINOTOMY  2018    Procedure: FORAMINOTOMY;  Surgeon: Celestino Ball M.D.;  Location: [facility];  Service: Neurosurgery   • TOE AMPUTATION  2013    Performed by Khang Orlando M.D. at [facility]   • VENTRAL SEPTAL DEFECT REPAIR  3/29/2013    Performed by Miesha Escalona M.D. at [facility]   • MULTIPLE CORONARY ARTERY BYPASS ENDO VEIN HARVEST  3/29/2013    Performed by Miesha Escalona M.D. at [facility]     Family History   Problem Relation Age of Onset   • Heart Disease Father 77         during heart surgery     Social History     Social History   • Marital status:      Spouse name: N/A   • Number of  children: N/A   • Years of education: N/A     Occupational History   • Not on file.     Social History Main Topics   • Smoking status: Former Smoker     Quit date: 1/27/1980   • Smokeless tobacco: Never Used   • Alcohol use No   • Drug use: No   • Sexual activity: Not on file     Other Topics Concern   • Not on file     Social History Narrative   • No narrative on file     Allergies   Allergen Reactions   • Morphine Sulfate Vomiting     [id number]     Outpatient Encounter Prescriptions as of 3/28/2019   Medication Sig Dispense Refill   • clopidogrel (PLAVIX) 75 MG Tab Take 1 Tab by mouth every day. 90 Tab 3   • rosuvastatin (CRESTOR) 20 MG Tab Take 1 Tab by mouth every evening. 90 Tab 3   • doxazosin (CARDURA) 4 MG Tab Take 1 Tab by mouth every day. 90 Tab 3   • amLODIPine (NORVASC) 5 MG Tab Take 1 Tab by mouth every day. 90 Tab 3   • losartan (COZAAR) 50 MG Tab Take 1 Tab by mouth every day. 90 Tab 3   • metoprolol SR (TOPROL XL) 25 MG TABLET SR 24 HR Take 1 Tab by mouth every day. 90 Tab 3   • hydroCHLOROthiazide (HYDRODIURIL) 12.5 MG tablet Take 1 Tab by mouth every day. 90 Tab 3   • methocarbamol (ROBAXIN) 750 MG Tab Take 1 Tab by mouth 3 times a day. 90 Tab 1   • ibuprofen (MOTRIN) 200 MG Tab Take 200 mg by mouth every 8 hours as needed.     • hydrocodone-acetaminophen (NORCO) 5-325 MG Tab per tablet Take 1-2 Tabs by mouth every four hours as needed.     • zolpidem (AMBIEN) 10 MG TABS Take 5 mg by mouth at bedtime as needed.     • Multiple Vitamin (MULTI VITAMIN MENS) TABS Take 1 Tab by mouth every day.     • docosahexanoic acid (OMEGA 3 FA) 1000 MG CAPS Take 1,000 mg by mouth 3 times a day, with meals.     • ascorbic acid (ASCORBIC ACID) 500 MG TABS Take 500 mg by mouth every day.     • Calcium Carb-Cholecalciferol (CALCIUM 1000 + D PO) Take 1 Tab by mouth every day.     • B Complex-Folic Acid (B COMPLEX-VITAMIN B12 PO) Take 1 Tab by mouth every day.     • timolol (TIMOPTIC) 0.5 % SOLN Place 1 Drop in both eyes  "every evening.     • [DISCONTINUED] aspirin (ASA) 325 MG Tab Take 325 mg by mouth every 6 hours as needed.     • [DISCONTINUED] methocarbamol (ROBAXIN) 750 MG Tab Take 1 Tab by mouth 3 times a day as needed. 90 Tab 0   • [DISCONTINUED] methocarbamol (ROBAXIN) 750 MG Tab Take 1 Tab by mouth 3 times a day as needed. 60 Tab 0   • [DISCONTINUED] methocarbamol (ROBAXIN) 750 MG Tab Take 1 Tab by mouth 3 times a day as needed. 60 Tab 0   • [DISCONTINUED] aspirin (ASA) 81 MG Chew Tab chewable tablet Take 1 Tab by mouth every day. 100 Tab 6     No facility-administered encounter medications on file as of 3/28/2019.      Review of Systems   Constitutional: Negative for chills and fever.   HENT: Negative for sore throat.    Eyes: Negative for blurred vision.   Respiratory: Negative for cough and shortness of breath.    Cardiovascular: Negative for chest pain, palpitations, claudication, leg swelling and PND.   Gastrointestinal: Negative for abdominal pain and nausea.   Musculoskeletal: Negative for falls and joint pain.   Skin: Negative for rash.   Neurological: Negative for dizziness, focal weakness and weakness.   Endo/Heme/Allergies: Does not bruise/bleed easily.        Objective:   /66 (BP Location: Right arm, Patient Position: Sitting)   Pulse (!) 52   Ht 1.778 m (5' 10\")   Wt 78.9 kg (174 lb)   SpO2 90%   BMI 24.97 kg/m²     Physical Exam   Constitutional: No distress.   HENT:   Mouth/Throat: Oropharynx is clear and moist. No oropharyngeal exudate.   Eyes: No scleral icterus.   Neck: No JVD present.   Cardiovascular: Normal rate and normal heart sounds.  Exam reveals no gallop and no friction rub.    No murmur heard.  Pulmonary/Chest: No respiratory distress. He has no wheezes. He has no rales.   Abdominal: Soft. Bowel sounds are normal.   Musculoskeletal: He exhibits no edema.   Neurological: He is alert.   Skin: No rash noted. He is not diaphoretic.   Psychiatric: He has a normal mood and affect.     We " reviewed in person the recent labs  Recent Results (from the past 4032 hour(s))   EKG    Collection Time: 19 10:08 AM   Result Value Ref Range    Report       [facility] Cardiology [place]    Test Date:  2019  Pt Name:    ENMA SALAMANCA              Department: [facility]  MRN:        [id number]                      Room:  Gender:     Male                         Technician: DARIAN  :        1935                   Requested By:AJ HAYNES  Order #:    [id number]                    Reading MD: Aj Haynes MD    Measurements  Intervals                                Axis  Rate:       54                           P:          61  IA:         204                          QRS:        53  QRSD:       108                          T:          246  QT:         480  QTc:        455    Interpretive Statements  SINUS BRADYCARDIA  INFERIOR INFARCT, AGE INDETERMINATE  NONSPECIFIC T ABNORMALITIES, ANT-LAT LEADS  Compared to ECG 2013 03:42:25  NO SIGNIFICANT CHANGES  Electronically Signed On 3- 16:28:56 PDT by Aj Haynes MD       We reviewed the outside hospital testing    Assessment:     1. Bilateral carotid artery stenosis moderate      2. CHF (congestive heart failure), NYHA class II, chronic, systolic (HCC) EF 45%      3. Coronary artery disease due to calcified coronary lesion  EKG   4. Ischemic cardiomyopathy - EF 45% 2018  EKG   5. Previous inferior myocardial infarction 2013 - complicated by VSD s/p CABG and VSD repair     6. S/P CABG x 1     7. VSD (ventricular septal defect) s/p repair     8. Cerebrovascular accident (CVA) due to embolism of precerebral artery (HCC)  Holter Monitor / Event Recorder    EKG       Medical Decision Making:  Today's Assessment / Status / Plan:     It was my pleasure to meet with Mr. Salamanca.    Blood pressure is well controlled.      He is on appropriate statin.    We should ensure that he does not have an occult arrhythmia which would  necessitate stronger anti-coagulant than the Plavix which she was appropriately started on    We will do a 30-day event monitor    I will see Mr. Salamanca back in 6 months time and encouraged him to follow up with us over the phone or e-mail using my MyChart as issues arise.    It is my pleasure to participate in the care of Mr. Salamanca.  Please do not hesitate to contact me with questions or concerns.    Aj Washington MD PhD [facility]  Cardiologist [facility]    Please note that this dictation was created using voice recognition software. I have worked with consultants from the vendor as well as technical experts from [facility] to optimize the interface. I have made every reasonable attempt to correct obvious errors, but I expect that there are errors of grammar and possibly content I did not discover before finalizing the note.

## 2019-05-14 DIAGNOSIS — I10 ESSENTIAL HYPERTENSION, BENIGN: ICD-10-CM

## 2019-05-14 RX ORDER — METOPROLOL SUCCINATE 25 MG/1
25 TABLET, EXTENDED RELEASE ORAL DAILY
Qty: 90 TAB | Refills: 3 | Status: SHIPPED | OUTPATIENT
Start: 2019-05-14 | End: 2020-07-01 | Stop reason: SDUPTHER

## 2019-05-16 ENCOUNTER — TELEPHONE (OUTPATIENT)
Dept: CARDIOLOGY | Facility: MEDICAL CENTER | Age: 84
End: 2019-05-16

## 2019-05-16 ENCOUNTER — NON-PROVIDER VISIT (OUTPATIENT)
Dept: CARDIOLOGY | Facility: MEDICAL CENTER | Age: 84
End: 2019-05-16
Payer: MEDICARE

## 2019-05-16 DIAGNOSIS — I47.10 SVT (SUPRAVENTRICULAR TACHYCARDIA): ICD-10-CM

## 2019-05-16 DIAGNOSIS — I63.9 CEREBROVASCULAR ACCIDENT (CVA), UNSPECIFIED MECHANISM (HCC): ICD-10-CM

## 2019-05-16 DIAGNOSIS — I63.10 CEREBROVASCULAR ACCIDENT (CVA) DUE TO EMBOLISM OF PRECEREBRAL ARTERY (HCC): ICD-10-CM

## 2019-05-16 PROCEDURE — 93268 ECG RECORD/REVIEW: CPT | Performed by: INTERNAL MEDICINE

## 2019-06-18 ENCOUNTER — TELEPHONE (OUTPATIENT)
Dept: CARDIOLOGY | Facility: MEDICAL CENTER | Age: 84
End: 2019-06-18

## 2019-06-18 NOTE — TELEPHONE ENCOUNTER
Holter Monitor / Event Recorder   Order: [id number]   Status:  Edited Result - FINAL   Visible to patient:  No (Inaccessible in MyChart)  Dx:  Cerebrovascular accident (CVA) due to...   Notes recorded by Aj Washington M.D. on 6/14/2019 at 8:34 AM PDT    The holter test looks good, please let him know     Thank you     Letter printed with MD recommendations and mailed to pt mailing address.

## 2019-10-03 ENCOUNTER — OFFICE VISIT (OUTPATIENT)
Dept: CARDIOLOGY | Facility: CLINIC | Age: 84
End: 2019-10-03
Payer: MEDICARE

## 2019-10-03 VITALS
HEART RATE: 60 BPM | SYSTOLIC BLOOD PRESSURE: 120 MMHG | OXYGEN SATURATION: 94 % | BODY MASS INDEX: 23.94 KG/M2 | DIASTOLIC BLOOD PRESSURE: 64 MMHG | HEIGHT: 71 IN | WEIGHT: 171 LBS

## 2019-10-03 DIAGNOSIS — I25.5 ISCHEMIC CARDIOMYOPATHY: Chronic | ICD-10-CM

## 2019-10-03 DIAGNOSIS — I25.2 PREVIOUS INFERIOR MYOCARDIAL INFARCTION OLDER THAN 8 WEEKS: Chronic | ICD-10-CM

## 2019-10-03 DIAGNOSIS — I10 ESSENTIAL HYPERTENSION: ICD-10-CM

## 2019-10-03 DIAGNOSIS — I25.84 CORONARY ARTERY DISEASE DUE TO CALCIFIED CORONARY LESION: ICD-10-CM

## 2019-10-03 DIAGNOSIS — I25.10 CORONARY ARTERY DISEASE DUE TO CALCIFIED CORONARY LESION: ICD-10-CM

## 2019-10-03 DIAGNOSIS — I65.23 BILATERAL CAROTID ARTERY STENOSIS: Chronic | ICD-10-CM

## 2019-10-03 DIAGNOSIS — Q21.0 VSD (VENTRICULAR SEPTAL DEFECT): Chronic | ICD-10-CM

## 2019-10-03 DIAGNOSIS — G45.9 TIA (TRANSIENT ISCHEMIC ATTACK): Chronic | ICD-10-CM

## 2019-10-03 DIAGNOSIS — Z95.1 S/P CABG X 1: ICD-10-CM

## 2019-10-03 DIAGNOSIS — I50.22 CHF (CONGESTIVE HEART FAILURE), NYHA CLASS II, CHRONIC, SYSTOLIC (HCC): Chronic | ICD-10-CM

## 2019-10-03 DIAGNOSIS — E78.5 DYSLIPIDEMIA: Chronic | ICD-10-CM

## 2019-10-03 PROCEDURE — 99214 OFFICE O/P EST MOD 30 MIN: CPT | Performed by: INTERNAL MEDICINE

## 2019-10-03 RX ORDER — ASPIRIN 81 MG/1
81 TABLET, CHEWABLE ORAL DAILY
Qty: 100 TAB | COMMUNITY
Start: 2019-10-03 | End: 2021-09-15

## 2019-10-03 ASSESSMENT — ENCOUNTER SYMPTOMS
CLAUDICATION: 0
SHORTNESS OF BREATH: 0
DIZZINESS: 0
COUGH: 0
BLURRED VISION: 0
ABDOMINAL PAIN: 0
SORE THROAT: 0
FEVER: 0
PALPITATIONS: 0
BRUISES/BLEEDS EASILY: 0
CHILLS: 0
FOCAL WEAKNESS: 0
FALLS: 0
WEAKNESS: 0
PND: 0
NAUSEA: 0

## 2019-10-03 NOTE — PROGRESS NOTES
Chief Complaint   Patient presents with   • Coronary Artery Disease       Subjective:   OLGA Salamanca is a 84 y.o. male who presents today for follow-up of his complex cardiac history including VSD in the setting of coronary disease status post CABG and repair    He did have another TIA lasting less than an hour we had difficulty finding his words or speaking    He still has a focal numbness of the finger and foot    We did a event monitor which did not show any significant arrhythmia    Past Medical History:   Diagnosis Date   • Bilateral carotid artery stenosis moderate 2018    • CHF (congestive heart failure), NYHA class II, chronic, systolic (HCC) EF 45% 2018    • Diabetes    • Hypertension    • Ischemic cardiomyopathy - EF 45% 2018    • Previous inferior myocardial infarction 2013 - complicated by VSD s/p CABG and VSD repair 5/31/2013   • S/P CABG x 1 7/19/2013    SVBG-PDA, 3/29/2013, Dr. Escalona    • TIA (transient ischemic attack) 2019 and prior based on clinical history    • VSD (ventricular septal defect) 3/27/2013    [place] Jem patch 3/29/2013, Dr. Escalona    • VSD (ventricular septal defect) s/p repair 3/27/2013    [place] Jem patch 3/29/2013, Dr. Escalona      Past Surgical History:   Procedure Laterality Date   • LUMBAR LAMINECTOMY DISKECTOMY Right 2/16/2018    Procedure: LUMBAR LAMINECTOMY DISKECTOMY- L5-S1;  Surgeon: Celestino Ball M.D.;  Location: [facility];  Service: Neurosurgery   • FORAMINOTOMY  2/16/2018    Procedure: FORAMINOTOMY;  Surgeon: Celestino Ball M.D.;  Location: [facility];  Service: Neurosurgery   • TOE AMPUTATION  4/16/2013    Performed by Khang Orlando M.D. at [facility]   • VENTRAL SEPTAL DEFECT REPAIR  3/29/2013    Performed by Miesha Escalona M.D. at [facility]   • MULTIPLE CORONARY ARTERY BYPASS ENDO VEIN HARVEST  3/29/2013    Performed by Miesha Escalona M.D. at [facility]     Family History    Problem Relation Age of Onset   • Heart Disease Father 77         during heart surgery     Social History     Socioeconomic History   • Marital status:      Spouse name: Not on file   • Number of children: Not on file   • Years of education: Not on file   • Highest education level: Not on file   Occupational History   • Not on file   Social Needs   • Financial resource strain: Not on file   • Food insecurity:     Worry: Not on file     Inability: Not on file   • Transportation needs:     Medical: Not on file     Non-medical: Not on file   Tobacco Use   • Smoking status: Former Smoker     Last attempt to quit: 1980     Years since quittin.7   • Smokeless tobacco: Never Used   Substance and Sexual Activity   • Alcohol use: No   • Drug use: No   • Sexual activity: Not on file   Lifestyle   • Physical activity:     Days per week: Not on file     Minutes per session: Not on file   • Stress: Not on file   Relationships   • Social connections:     Talks on phone: Not on file     Gets together: Not on file     Attends Christianity service: Not on file     Active member of club or organization: Not on file     Attends meetings of clubs or organizations: Not on file     Relationship status: Not on file   • Intimate partner violence:     Fear of current or ex partner: Not on file     Emotionally abused: Not on file     Physically abused: Not on file     Forced sexual activity: Not on file   Other Topics Concern   • Not on file   Social History Narrative   • Not on file     Allergies   Allergen Reactions   • Morphine Sulfate Vomiting     [id number]     Outpatient Encounter Medications as of 10/3/2019   Medication Sig Dispense Refill   • aspirin (ASA) 81 MG Chew Tab chewable tablet Take 1 Tab by mouth every day. 100 Tab    • apixaban (ELIQUIS) 5mg Tab Take 1 Tab by mouth 2 Times a Day. 180 Tab 3   • metoprolol SR (TOPROL XL) 25 MG TABLET SR 24 HR Take 1 Tab by mouth every day. 90 Tab 3   • rosuvastatin  (CRESTOR) 20 MG Tab Take 1 Tab by mouth every evening. 90 Tab 3   • doxazosin (CARDURA) 4 MG Tab Take 1 Tab by mouth every day. 90 Tab 3   • amLODIPine (NORVASC) 5 MG Tab Take 1 Tab by mouth every day. 90 Tab 3   • losartan (COZAAR) 50 MG Tab Take 1 Tab by mouth every day. 90 Tab 3   • hydroCHLOROthiazide (HYDRODIURIL) 12.5 MG tablet Take 1 Tab by mouth every day. 90 Tab 3   • methocarbamol (ROBAXIN) 750 MG Tab Take 1 Tab by mouth 3 times a day. 90 Tab 1   • ibuprofen (MOTRIN) 200 MG Tab Take 200 mg by mouth every 8 hours as needed.     • hydrocodone-acetaminophen (NORCO) 5-325 MG Tab per tablet Take 1-2 Tabs by mouth every four hours as needed.     • zolpidem (AMBIEN) 10 MG TABS Take 5 mg by mouth at bedtime as needed.     • Multiple Vitamin (MULTI VITAMIN MENS) TABS Take 1 Tab by mouth every day.     • ascorbic acid (ASCORBIC ACID) 500 MG TABS Take 500 mg by mouth every day.     • Calcium Carb-Cholecalciferol (CALCIUM 1000 + D PO) Take 1 Tab by mouth every day.     • B Complex-Folic Acid (B COMPLEX-VITAMIN B12 PO) Take 1 Tab by mouth every day.     • timolol (TIMOPTIC) 0.5 % SOLN Place 1 Drop in both eyes every evening.     • [DISCONTINUED] clopidogrel (PLAVIX) 75 MG Tab Take 1 Tab by mouth every day. 90 Tab 3   • [DISCONTINUED] docosahexanoic acid (OMEGA 3 FA) 1000 MG CAPS Take 1,000 mg by mouth 3 times a day, with meals.       No facility-administered encounter medications on file as of 10/3/2019.      Review of Systems   Constitutional: Negative for chills and fever.   HENT: Negative for sore throat.    Eyes: Negative for blurred vision.   Respiratory: Negative for cough and shortness of breath.    Cardiovascular: Negative for chest pain, palpitations, claudication, leg swelling and PND.   Gastrointestinal: Negative for abdominal pain and nausea.   Musculoskeletal: Negative for falls and joint pain.   Skin: Negative for rash.   Neurological: Negative for dizziness, focal weakness and weakness.  "  Endo/Heme/Allergies: Does not bruise/bleed easily.        Objective:   /64 (BP Location: Left arm, Patient Position: Sitting, BP Cuff Size: Adult)   Pulse 60   Ht 1.791 m (5' 10.5\")   Wt 77.6 kg (171 lb)   SpO2 94%   BMI 24.19 kg/m²     Physical Exam   Constitutional: No distress.   HENT:   Mouth/Throat: Oropharynx is clear and moist. No oropharyngeal exudate.   Eyes: No scleral icterus.   Neck: No JVD present.   Cardiovascular: Normal rate and normal heart sounds. Exam reveals no gallop and no friction rub.   No murmur heard.  Pulmonary/Chest: No respiratory distress. He has no wheezes. He has no rales.   Abdominal: Soft. Bowel sounds are normal.   Musculoskeletal: He exhibits no edema.   Neurological: He is alert.   Skin: No rash noted. He is not diaphoretic.   Psychiatric: He has a normal mood and affect.     We reviewed in person the most recent labs    We reviewed the tracings from his event monitor for 25 days    Assessment:     1. Coronary artery disease due to calcified coronary lesion     2. CHF (congestive heart failure), NYHA class II, chronic, systolic (HCC) EF 45% 2018     3. Bilateral carotid artery stenosis moderate 2018     4. Dyslipidemia     5. Essential hypertension     6. Ischemic cardiomyopathy - EF 45% 2018     7. Previous inferior myocardial infarction 2013 - complicated by VSD s/p CABG and VSD repair     8. S/P CABG x 1     9. VSD (ventricular septal defect) s/p repair     10. TIA (transient ischemic attack) 2019 and prior based on clinical history         Medical Decision Making:  Today's Assessment / Status / Plan:     It was my pleasure to meet with Mr. Salamanca.    Blood pressure is well controlled.      He is on appropriate statin.    We discussed the options for treatment of his TIA given need to continue if symptoms on Plavix I am concerned he has an occult arrhythmia especially with his history the event monitor did not show this but I think we would err on the side of " treating this as paroxysmal A. fib and I encouraged him to switch from the Plavix to aspirin 81 and Eliquis.  We could certainly do a loop recorder for better monitoring of his heart rhythm but he would prefer just to more empiric therapy    I will see Mr. Salamanca back in 6 months time and encouraged him to follow up with us over the phone or electronically using my MyChart as issues arise.    It is my pleasure to participate in the care of Mr. Salamanca.  Please do not hesitate to contact me with questions or concerns.    Aj Washington MD PhD [facility]  Cardiologist [facility]    Please note that this dictation was created using voice recognition software. I have worked with consultants from the vendor as well as technical experts from [facility] to optimize the interface. I have made every reasonable attempt to correct obvious errors, but I expect that there are errors of grammar and possibly content I did not discover before finalizing the note.

## 2020-01-13 ENCOUNTER — TELEPHONE (OUTPATIENT)
Dept: CARDIOLOGY | Facility: MEDICAL CENTER | Age: 85
End: 2020-01-13

## 2020-01-13 DIAGNOSIS — I10 ESSENTIAL HYPERTENSION, BENIGN: ICD-10-CM

## 2020-01-13 RX ORDER — AMLODIPINE BESYLATE 5 MG/1
5 TABLET ORAL DAILY
Qty: 90 TAB | Refills: 2 | Status: SHIPPED | OUTPATIENT
Start: 2020-01-13 | End: 2020-12-07

## 2020-01-13 RX ORDER — LOSARTAN POTASSIUM 50 MG/1
50 TABLET ORAL DAILY
Qty: 90 TAB | Refills: 2 | Status: SHIPPED | OUTPATIENT
Start: 2020-01-13 | End: 2020-12-07

## 2020-01-13 RX ORDER — HYDROCHLOROTHIAZIDE 12.5 MG/1
12.5 TABLET ORAL DAILY
Qty: 90 TAB | Refills: 2 | Status: SHIPPED | OUTPATIENT
Start: 2020-01-13 | End: 2023-08-30

## 2020-01-13 NOTE — TELEPHONE ENCOUNTER
Nicole,       Patient needs refill for his Amlodipine, Losartan and HCTZ to [facility]. He said his pharmacy told him they faxed over a request a week ago for these refills. Pt. #[phone].

## 2020-01-24 DIAGNOSIS — E78.5 DYSLIPIDEMIA: Primary | Chronic | ICD-10-CM

## 2020-01-24 RX ORDER — ROSUVASTATIN CALCIUM 20 MG/1
20 TABLET, COATED ORAL EVERY EVENING
Qty: 90 TAB | Refills: 2 | Status: SHIPPED | OUTPATIENT
Start: 2020-01-24 | End: 2020-12-04

## 2020-07-01 ENCOUNTER — TELEPHONE (OUTPATIENT)
Dept: CARDIOLOGY | Facility: MEDICAL CENTER | Age: 85
End: 2020-07-01

## 2020-07-01 DIAGNOSIS — I10 ESSENTIAL HYPERTENSION, BENIGN: ICD-10-CM

## 2020-07-01 RX ORDER — METOPROLOL SUCCINATE 25 MG/1
25 TABLET, EXTENDED RELEASE ORAL DAILY
Qty: 90 TAB | Refills: 1 | Status: SHIPPED | OUTPATIENT
Start: 2020-07-01 | End: 2021-09-15 | Stop reason: SDUPTHER

## 2020-07-01 NOTE — TELEPHONE ENCOUNTER
Nicole    Pt calling for renewal of metoprolol SR (TOPROL XL) 25 MG TABLET SR 24 HR, pt has 1 tablet of the 3 that the pt received from the pharmacy, pt was out of metoprolol for 1 week prior to that.    Pt states he has made a previous call to renew and pharmacy has nothing from us.    Pt continues to use [facility].    Please call pt  if questions.

## 2020-09-15 ENCOUNTER — OFFICE VISIT (OUTPATIENT)
Dept: CARDIOLOGY | Facility: CLINIC | Age: 85
End: 2020-09-15
Payer: MEDICARE

## 2020-09-15 VITALS
HEIGHT: 71 IN | HEART RATE: 62 BPM | SYSTOLIC BLOOD PRESSURE: 130 MMHG | BODY MASS INDEX: 24.22 KG/M2 | OXYGEN SATURATION: 92 % | WEIGHT: 173 LBS | DIASTOLIC BLOOD PRESSURE: 62 MMHG

## 2020-09-15 DIAGNOSIS — Z95.1 S/P CABG X 1: ICD-10-CM

## 2020-09-15 DIAGNOSIS — I50.22 CHF (CONGESTIVE HEART FAILURE), NYHA CLASS II, CHRONIC, SYSTOLIC (HCC): Chronic | ICD-10-CM

## 2020-09-15 DIAGNOSIS — E78.5 DYSLIPIDEMIA: Chronic | ICD-10-CM

## 2020-09-15 DIAGNOSIS — I25.2 PREVIOUS INFERIOR MYOCARDIAL INFARCTION OLDER THAN 8 WEEKS: Chronic | ICD-10-CM

## 2020-09-15 DIAGNOSIS — I25.5 ISCHEMIC CARDIOMYOPATHY: Chronic | ICD-10-CM

## 2020-09-15 DIAGNOSIS — I25.10 CORONARY ARTERY DISEASE DUE TO CALCIFIED CORONARY LESION: ICD-10-CM

## 2020-09-15 DIAGNOSIS — Z79.01 CHRONIC ANTICOAGULATION: Chronic | ICD-10-CM

## 2020-09-15 DIAGNOSIS — I65.23 BILATERAL CAROTID ARTERY STENOSIS: Chronic | ICD-10-CM

## 2020-09-15 DIAGNOSIS — I25.84 CORONARY ARTERY DISEASE DUE TO CALCIFIED CORONARY LESION: ICD-10-CM

## 2020-09-15 DIAGNOSIS — G45.9 TIA (TRANSIENT ISCHEMIC ATTACK): Chronic | ICD-10-CM

## 2020-09-15 DIAGNOSIS — Q21.0 VENTRICULAR SEPTAL DEFECT: Chronic | ICD-10-CM

## 2020-09-15 DIAGNOSIS — I10 ESSENTIAL HYPERTENSION: ICD-10-CM

## 2020-09-15 PROCEDURE — 99214 OFFICE O/P EST MOD 30 MIN: CPT | Performed by: INTERNAL MEDICINE

## 2020-09-15 RX ORDER — BIMATOPROST 0.1 MG/ML
1 SOLUTION/ DROPS OPHTHALMIC
COMMUNITY
Start: 2020-09-02

## 2020-09-15 RX ORDER — BRIMONIDINE TARTRATE 2 MG/ML
1 SOLUTION/ DROPS OPHTHALMIC DAILY
COMMUNITY
Start: 2020-09-03

## 2020-09-15 RX ORDER — MAGNESIUM OXIDE 400 MG/1
400 TABLET ORAL DAILY
COMMUNITY

## 2020-09-15 ASSESSMENT — ENCOUNTER SYMPTOMS
CHILLS: 0
FEVER: 0
SHORTNESS OF BREATH: 0
FALLS: 0
SORE THROAT: 0
PALPITATIONS: 0
WEAKNESS: 0
FOCAL WEAKNESS: 0
BLURRED VISION: 0
ABDOMINAL PAIN: 0
PND: 0
COUGH: 0
CLAUDICATION: 0
BRUISES/BLEEDS EASILY: 1
DIZZINESS: 1
NAUSEA: 0

## 2020-09-15 NOTE — PROGRESS NOTES
Chief Complaint   Patient presents with   • Coronary Artery Disease       Subjective:   OLGA Salamanca is a 85 y.o. male who presents today For follow-up of his complex cardiac history including CABG with VSD that got repaired remotely more recently he has had TIAs and so we switched him from Plavix to Eliquis he reports easy bruising from discharge    Past Medical History:   Diagnosis Date   • Bilateral carotid artery stenosis moderate     • CHF (congestive heart failure), NYHA class II, chronic, systolic (HCC) EF 45%     • Chronic anticoagulation    • Diabetes    • Hypertension    • Ischemic cardiomyopathy - EF 45%     • Previous inferior myocardial infarction  - complicated by VSD s/p CABG and VSD repair 2013   • S/P CABG x 1 2013    SVBG-PDA, 3/29/2013, Dr. Escalona    • TIA (transient ischemic attack)  and prior based on clinical history    • VSD (ventricular septal defect) 3/27/2013    [place] Jem patch 3/29/2013, Dr. Escalona    • VSD (ventricular septal defect) s/p repair 3/27/2013    [place] Jem patch 3/29/2013, Dr. Escalona      Past Surgical History:   Procedure Laterality Date   • LUMBAR LAMINECTOMY DISKECTOMY Right 2018    Procedure: LUMBAR LAMINECTOMY DISKECTOMY- L5-S1;  Surgeon: Celestino Ball M.D.;  Location: [facility];  Service: Neurosurgery   • FORAMINOTOMY  2018    Procedure: FORAMINOTOMY;  Surgeon: Celestino Ball M.D.;  Location: [facility];  Service: Neurosurgery   • TOE AMPUTATION  2013    Performed by Khang Orlando M.D. at [facility]   • VENTRAL SEPTAL DEFECT REPAIR  3/29/2013    Performed by Miesha Escalona M.D. at [facility]   • MULTIPLE CORONARY ARTERY BYPASS ENDO VEIN HARVEST  3/29/2013    Performed by Miesha Escalona M.D. at [facility]     Family History   Problem Relation Age of Onset   • Heart Disease Father 77         during heart surgery     Social History      Socioeconomic History   • Marital status:      Spouse name: Not on file   • Number of children: Not on file   • Years of education: Not on file   • Highest education level: Not on file   Occupational History   • Not on file   Social Needs   • Financial resource strain: Not on file   • Food insecurity     Worry: Not on file     Inability: Not on file   • Transportation needs     Medical: Not on file     Non-medical: Not on file   Tobacco Use   • Smoking status: Former Smoker     Quit date: 1980     Years since quittin.6   • Smokeless tobacco: Never Used   Substance and Sexual Activity   • Alcohol use: No   • Drug use: No   • Sexual activity: Not on file   Lifestyle   • Physical activity     Days per week: Not on file     Minutes per session: Not on file   • Stress: Not on file   Relationships   • Social connections     Talks on phone: Not on file     Gets together: Not on file     Attends Sikh service: Not on file     Active member of club or organization: Not on file     Attends meetings of clubs or organizations: Not on file     Relationship status: Not on file   • Intimate partner violence     Fear of current or ex partner: Not on file     Emotionally abused: Not on file     Physically abused: Not on file     Forced sexual activity: Not on file   Other Topics Concern   • Not on file   Social History Narrative   • Not on file     Allergies   Allergen Reactions   • Morphine Sulfate Vomiting     [id number]     Outpatient Encounter Medications as of 9/15/2020   Medication Sig Dispense Refill   • LUMIGAN 0.01 % Solution      • brimonidine (ALPHAGAN) 0.2 % Solution      • magnesium oxide (MAG-OX) 400 MG Tab tablet Take 400 mg by mouth every day.     • Coenzyme Q10 300 MG Cap Take 1 Cap by mouth every day. 30 Cap    • metoprolol SR (TOPROL XL) 25 MG TABLET SR 24 HR Take 1 Tab by mouth every day. 90 Tab 1   • rosuvastatin (CRESTOR) 20 MG Tab Take 1 Tab by mouth every evening. 90 Tab 2   •  amLODIPine (NORVASC) 5 MG Tab Take 1 Tab by mouth every day. 90 Tab 2   • losartan (COZAAR) 50 MG Tab Take 1 Tab by mouth every day. 90 Tab 2   • hydroCHLOROthiazide (HYDRODIURIL) 12.5 MG tablet Take 1 Tab by mouth every day. 90 Tab 2   • aspirin (ASA) 81 MG Chew Tab chewable tablet Take 1 Tab by mouth every day. 100 Tab    • apixaban (ELIQUIS) 5mg Tab Take 1 Tab by mouth 2 Times a Day. 180 Tab 3   • doxazosin (CARDURA) 4 MG Tab Take 1 Tab by mouth every day. 90 Tab 3   • methocarbamol (ROBAXIN) 750 MG Tab Take 1 Tab by mouth 3 times a day. 90 Tab 1   • ibuprofen (MOTRIN) 200 MG Tab Take 200 mg by mouth every 8 hours as needed.     • hydrocodone-acetaminophen (NORCO) 5-325 MG Tab per tablet Take 1-2 Tabs by mouth every four hours as needed.     • zolpidem (AMBIEN) 10 MG TABS Take 5 mg by mouth at bedtime as needed.     • Multiple Vitamin (MULTI VITAMIN MENS) TABS Take 1 Tab by mouth every day.     • ascorbic acid (ASCORBIC ACID) 500 MG TABS Take 500 mg by mouth every day.     • Calcium Carb-Cholecalciferol (CALCIUM 1000 + D PO) Take 1 Tab by mouth every day.     • B Complex-Folic Acid (B COMPLEX-VITAMIN B12 PO) Take 1 Tab by mouth every day.     • [DISCONTINUED] timolol (TIMOPTIC) 0.5 % SOLN Place 1 Drop in both eyes every evening.       No facility-administered encounter medications on file as of 9/15/2020.      Review of Systems   Constitutional: Negative for chills and fever.   HENT: Negative for sore throat.    Eyes: Negative for blurred vision.   Respiratory: Negative for cough and shortness of breath.    Cardiovascular: Negative for chest pain, palpitations, claudication, leg swelling and PND.   Gastrointestinal: Negative for abdominal pain and nausea.   Musculoskeletal: Positive for joint pain. Negative for falls.   Skin: Negative for rash.   Neurological: Positive for dizziness. Negative for focal weakness and weakness.   Endo/Heme/Allergies: Bruises/bleeds easily.        Objective:   /62 (BP Location:  "Right arm, Patient Position: Sitting)   Pulse 62   Ht 1.791 m (5' 10.5\")   Wt 78.5 kg (173 lb)   SpO2 92%   BMI 24.47 kg/m²     Physical Exam   Constitutional: No distress.   HENT:   Patient wearing a mask due to COVID precautions   Eyes: No scleral icterus.   Neck: No JVD present.   Cardiovascular: Normal rate. Exam reveals no gallop and no friction rub.   Murmur heard.  Pulmonary/Chest: No respiratory distress. He has no wheezes. He has no rales.   Abdominal: Soft. Bowel sounds are normal.   Musculoskeletal:         General: No edema.   Neurological: He is alert.   Skin: No rash noted. He is not diaphoretic.   Psychiatric: He has a normal mood and affect.     We reviewed in person the most recent labs   his glucose was 129 creatinine 0.94 his age normal HDL 51 LDL 33 CBC normal  Assessment:     1. Bilateral carotid artery stenosis moderate 2018     2. CHF (congestive heart failure), NYHA class II, chronic, systolic (HCC) EF 45% 2018     3. Coronary artery disease due to calcified coronary lesion     4. Dyslipidemia     5. Essential hypertension     6. Ischemic cardiomyopathy - EF 45% 2018     7. Previous inferior myocardial infarction 2013 - complicated by VSD s/p CABG and VSD repair     8. S/P CABG x 1     9. VSD (ventricular septal defect) s/p repair     10. TIA (transient ischemic attack) 2019 and prior based on clinical history     11. Chronic anticoagulation         Medical Decision Making:  Today's Assessment / Status / Plan:     It was my pleasure to meet with Mr. Salamanca.    Blood pressure is well controlled.  We specifically assessed the labs on hypertension treatment    He is on appropriate statin.    He understands the risks and benefits of chronic anticoagulation.  We reviewed and verified the results of annual testing for anemia and kidney function.  I will see Mr. Salamanca back in 1 year time and encouraged him to follow up with us over the phone or electronically using my MyChart as issues " arise.    It is my pleasure to participate in the care of Mr. Salamanca.  Please do not hesitate to contact me with questions or concerns.    Aj Washington MD PhD [facility]  Cardiologist [facility]    Please note that this dictation was created using voice recognition software. There may be errors I did not discover before finalizing the note.

## 2020-10-07 DIAGNOSIS — I25.5 ISCHEMIC CARDIOMYOPATHY: Chronic | ICD-10-CM

## 2020-12-01 DIAGNOSIS — I10 ESSENTIAL HYPERTENSION: ICD-10-CM

## 2020-12-01 DIAGNOSIS — E78.5 DYSLIPIDEMIA: Chronic | ICD-10-CM

## 2020-12-04 DIAGNOSIS — I10 ESSENTIAL HYPERTENSION, BENIGN: ICD-10-CM

## 2020-12-04 RX ORDER — HYDROCHLOROTHIAZIDE 12.5 MG/1
CAPSULE, GELATIN COATED ORAL
Qty: 90 CAP | Refills: 3 | Status: SHIPPED | OUTPATIENT
Start: 2020-12-04 | End: 2021-12-17

## 2020-12-04 RX ORDER — ROSUVASTATIN CALCIUM 20 MG/1
TABLET, COATED ORAL
Qty: 90 TAB | Refills: 3 | Status: SHIPPED | OUTPATIENT
Start: 2020-12-04 | End: 2021-12-17

## 2020-12-07 RX ORDER — LOSARTAN POTASSIUM 50 MG/1
TABLET ORAL
Qty: 90 TAB | Refills: 3 | Status: SHIPPED | OUTPATIENT
Start: 2020-12-07 | End: 2021-12-10

## 2020-12-07 RX ORDER — AMLODIPINE BESYLATE 5 MG/1
TABLET ORAL
Qty: 90 TAB | Refills: 3 | Status: SHIPPED | OUTPATIENT
Start: 2020-12-07 | End: 2021-12-10

## 2021-09-15 ENCOUNTER — TELEMEDICINE (OUTPATIENT)
Dept: CARDIOLOGY | Facility: MEDICAL CENTER | Age: 86
End: 2021-09-15
Payer: MEDICARE

## 2021-09-15 VITALS
OXYGEN SATURATION: 95 % | HEIGHT: 70 IN | BODY MASS INDEX: 24.77 KG/M2 | WEIGHT: 173 LBS | DIASTOLIC BLOOD PRESSURE: 74 MMHG | HEART RATE: 61 BPM | SYSTOLIC BLOOD PRESSURE: 142 MMHG

## 2021-09-15 DIAGNOSIS — I50.22 CHF (CONGESTIVE HEART FAILURE), NYHA CLASS II, CHRONIC, SYSTOLIC (HCC): Chronic | ICD-10-CM

## 2021-09-15 DIAGNOSIS — I25.10 CORONARY ARTERY DISEASE DUE TO CALCIFIED CORONARY LESION: ICD-10-CM

## 2021-09-15 DIAGNOSIS — I25.2 PREVIOUS INFERIOR MYOCARDIAL INFARCTION OLDER THAN 8 WEEKS: Chronic | ICD-10-CM

## 2021-09-15 DIAGNOSIS — G45.9 TIA (TRANSIENT ISCHEMIC ATTACK): Chronic | ICD-10-CM

## 2021-09-15 DIAGNOSIS — Z95.1 S/P CABG X 1: ICD-10-CM

## 2021-09-15 DIAGNOSIS — I10 ESSENTIAL HYPERTENSION: ICD-10-CM

## 2021-09-15 DIAGNOSIS — I10 ESSENTIAL HYPERTENSION, BENIGN: ICD-10-CM

## 2021-09-15 DIAGNOSIS — I25.5 ISCHEMIC CARDIOMYOPATHY: Chronic | ICD-10-CM

## 2021-09-15 DIAGNOSIS — Z79.01 CHRONIC ANTICOAGULATION: Chronic | ICD-10-CM

## 2021-09-15 DIAGNOSIS — I25.84 CORONARY ARTERY DISEASE DUE TO CALCIFIED CORONARY LESION: ICD-10-CM

## 2021-09-15 DIAGNOSIS — I65.23 BILATERAL CAROTID ARTERY STENOSIS: Chronic | ICD-10-CM

## 2021-09-15 DIAGNOSIS — Q21.0 VENTRICULAR SEPTAL DEFECT: Chronic | ICD-10-CM

## 2021-09-15 PROCEDURE — 99214 OFFICE O/P EST MOD 30 MIN: CPT | Mod: 95 | Performed by: INTERNAL MEDICINE

## 2021-09-15 RX ORDER — ACETAMINOPHEN 650 MG/1
650 SUPPOSITORY RECTAL EVERY 4 HOURS PRN
COMMUNITY
End: 2023-08-30

## 2021-09-15 RX ORDER — FLUOROURACIL 50 MG/G
CREAM TOPICAL
COMMUNITY
Start: 2021-08-26 | End: 2021-09-15

## 2021-09-15 RX ORDER — IBUPROFEN 200 MG
950 CAPSULE ORAL DAILY
COMMUNITY
End: 2023-08-30

## 2021-09-15 RX ORDER — METOPROLOL SUCCINATE 25 MG/1
25 TABLET, EXTENDED RELEASE ORAL DAILY
Qty: 90 TABLET | Refills: 3 | Status: SHIPPED | OUTPATIENT
Start: 2021-09-15 | End: 2022-09-07 | Stop reason: SDUPTHER

## 2021-09-15 ASSESSMENT — ENCOUNTER SYMPTOMS
WEAKNESS: 0
COUGH: 0
SHORTNESS OF BREATH: 0
FALLS: 0
SORE THROAT: 0
BLURRED VISION: 0
BRUISES/BLEEDS EASILY: 0
PND: 0
PALPITATIONS: 0
DIZZINESS: 0
ABDOMINAL PAIN: 0
NAUSEA: 0
CLAUDICATION: 0
FOCAL WEAKNESS: 0
FEVER: 0
CHILLS: 0

## 2021-09-15 NOTE — PROGRESS NOTES
Chief Complaint   Patient presents with   • Dyslipidemia   • Cardiomyopathy (Ischemic)   • Congestive Heart Failure   • HTN (Controlled)   • Coronary Artery Disease   • Transient Ischemic Attack       Subjective     OLGA Salamanca is a 86 y.o. male who presents today for follow-up of his complex cardiac history including CABG with VSD in 2013 he has had some TIAs subsequent to that on antiplatelet will be switched to Eliquis those resolved which is good it does have a little more expense obviously    We are seeing each other today through the virtual visit worked well no new complaints tolerating his medications well recent labs in May are excellent    Past Medical History:   Diagnosis Date   • Bilateral carotid artery stenosis moderate 2018    • CHF (congestive heart failure), NYHA class II, chronic, systolic (HCC) EF 45% 2018    • Chronic anticoagulation    • Diabetes    • Hypertension    • Ischemic cardiomyopathy - EF 45% 2018    • Previous inferior myocardial infarction 2013 - complicated by VSD s/p CABG and VSD repair 5/31/2013   • S/P CABG x 1 7/19/2013    SVBG-PDA, 3/29/2013, Dr. Escalona    • TIA (transient ischemic attack) 2019 and prior based on clinical history    • VSD (ventricular septal defect) 3/27/2013    [place] Jem patch 3/29/2013, Dr. Escalona    • VSD (ventricular septal defect) s/p repair 3/27/2013    [place] Jem patch 3/29/2013, Dr. Escalona      Past Surgical History:   Procedure Laterality Date   • LUMBAR LAMINECTOMY DISKECTOMY Right 2/16/2018    Procedure: LUMBAR LAMINECTOMY DISKECTOMY- L5-S1;  Surgeon: Celestino Ball M.D.;  Location: SURGERY [facility];  Service: Neurosurgery   • FORAMINOTOMY  2/16/2018    Procedure: FORAMINOTOMY;  Surgeon: Celestino Ball M.D.;  Location: [facility];  Service: Neurosurgery   • TOE AMPUTATION  4/16/2013    Performed by Khang Orlando M.D. at [facility]   • VENTRAL SEPTAL DEFECT REPAIR  3/29/2013    Performed by Miesha  EMILIANO Escalona at SURGERY [facility]   • MULTIPLE CORONARY ARTERY BYPASS ENDO VEIN HARVEST  3/29/2013    Performed by Miesha Escalona M.D. at SURGERY [facility]     Family History   Problem Relation Age of Onset   • Heart Disease Father 77         during heart surgery     Social History     Socioeconomic History   • Marital status:      Spouse name: Not on file   • Number of children: Not on file   • Years of education: Not on file   • Highest education level: Not on file   Occupational History   • Not on file   Tobacco Use   • Smoking status: Former Smoker     Quit date: 1980     Years since quittin.6   • Smokeless tobacco: Never Used   Substance and Sexual Activity   • Alcohol use: No   • Drug use: No   • Sexual activity: Not on file   Other Topics Concern   • Not on file   Social History Narrative   • Not on file     Social Determinants of Health     Financial Resource Strain:    • Difficulty of Paying Living Expenses:    Food Insecurity:    • Worried About Running Out of Food in the Last Year:    • Ran Out of Food in the Last Year:    Transportation Needs:    • Lack of Transportation (Medical):    • Lack of Transportation (Non-Medical):    Physical Activity:    • Days of Exercise per Week:    • Minutes of Exercise per Session:    Stress:    • Feeling of Stress :    Social Connections:    • Frequency of Communication with Friends and Family:    • Frequency of Social Gatherings with Friends and Family:    • Attends Shinto Services:    • Active Member of Clubs or Organizations:    • Attends Club or Organization Meetings:    • Marital Status:    Intimate Partner Violence:    • Fear of Current or Ex-Partner:    • Emotionally Abused:    • Physically Abused:    • Sexually Abused:      Allergies   Allergen Reactions   • Morphine Sulfate Vomiting     [id number]     Outpatient Encounter Medications as of 9/15/2021   Medication Sig Dispense Refill   • calcium citrate (CALCIUM CITRATE) 950  (200 Ca) MG Tab Take 950 mg by mouth every day.     • Misc Natural Products (OSTEO BI-FLEX ADV JOINT SHIELD PO) Take  by mouth.     • acetaminophen (TYLENOL) 650 MG Suppos Insert 650 mg into the rectum every four hours as needed.     • metoprolol SR (TOPROL XL) 25 MG TABLET SR 24 HR Take 1 Tablet by mouth every day. 90 Tablet 3   • apixaban (ELIQUIS) 5mg Tab Take 1 Tablet by mouth 2 times a day. 180 Tablet 3   • losartan (COZAAR) 50 MG Tab TAKE 1 TABLET DAILY. 90 Tab 3   • amLODIPine (NORVASC) 5 MG Tab TAKE 1 TABLET DAILY. 90 Tab 3   • rosuvastatin (CRESTOR) 20 MG Tab TAKE ONE TABLET EVERY EVENING. 90 Tab 3   • hydrochlorothiazide (MICROZIDE) 12.5 MG capsule TAKE ONE CAPSULE ORALLY EACH DAY. 90 Cap 3   • LUMIGAN 0.01 % Solution      • magnesium oxide (MAG-OX) 400 MG Tab tablet Take 400 mg by mouth every day.     • hydroCHLOROthiazide (HYDRODIURIL) 12.5 MG tablet Take 1 Tab by mouth every day. 90 Tab 2   • doxazosin (CARDURA) 4 MG Tab Take 1 Tab by mouth every day. 90 Tab 3   • zolpidem (AMBIEN) 10 MG TABS Take 5 mg by mouth at bedtime as needed.     • B Complex-Folic Acid (B COMPLEX-VITAMIN B12 PO) Take 1 Tab by mouth every day.     • [DISCONTINUED] fluorouracil (EFUDEX) 5 % cream  (Patient not taking: Reported on 9/15/2021)     • [DISCONTINUED] apixaban (ELIQUIS) 5mg Tab Take 1 Tab by mouth 2 Times a Day. 180 Tab 3   • brimonidine (ALPHAGAN) 0.2 % Solution  (Patient not taking: Reported on 9/15/2021)     • [DISCONTINUED] Coenzyme Q10 300 MG Cap Take 1 Cap by mouth every day. (Patient not taking: Reported on 9/15/2021) 30 Cap    • [DISCONTINUED] metoprolol SR (TOPROL XL) 25 MG TABLET SR 24 HR Take 1 Tab by mouth every day. 90 Tab 1   • [DISCONTINUED] aspirin (ASA) 81 MG Chew Tab chewable tablet Take 1 Tab by mouth every day. (Patient not taking: Reported on 9/15/2021) 100 Tab    • [DISCONTINUED] methocarbamol (ROBAXIN) 750 MG Tab Take 1 Tab by mouth 3 times a day. (Patient not taking: Reported on 9/15/2021) 90 Tab 1  "  • [DISCONTINUED] ibuprofen (MOTRIN) 200 MG Tab Take 200 mg by mouth every 8 hours as needed. (Patient not taking: Reported on 9/15/2021)     • [DISCONTINUED] hydrocodone-acetaminophen (NORCO) 5-325 MG Tab per tablet Take 1-2 Tabs by mouth every four hours as needed. (Patient not taking: Reported on 9/15/2021)     • Calcium Carb-Cholecalciferol (CALCIUM 1000 + D PO) Take 1 Tab by mouth every day. (Patient not taking: Reported on 9/15/2021)     • [DISCONTINUED] Multiple Vitamin (MULTI VITAMIN MENS) TABS Take 1 Tab by mouth every day. (Patient not taking: Reported on 9/15/2021)     • [DISCONTINUED] ascorbic acid (ASCORBIC ACID) 500 MG TABS Take 500 mg by mouth every day. (Patient not taking: Reported on 9/15/2021)       No facility-administered encounter medications on file as of 9/15/2021.     Review of Systems   Constitutional: Negative for chills and fever.   HENT: Negative for sore throat.    Eyes: Negative for blurred vision.   Respiratory: Negative for cough and shortness of breath.    Cardiovascular: Negative for chest pain, palpitations, claudication, leg swelling and PND.   Gastrointestinal: Negative for abdominal pain and nausea.   Musculoskeletal: Negative for falls and joint pain.   Skin: Negative for rash.   Neurological: Negative for dizziness, focal weakness and weakness.   Endo/Heme/Allergies: Does not bruise/bleed easily.              Objective     /74 (BP Location: Left arm, Patient Position: Sitting, BP Cuff Size: Adult)   Pulse 61   Ht 1.778 m (5' 10\")   Wt 78.5 kg (173 lb)   SpO2 95%   BMI 24.82 kg/m²     Physical Exam       Vital signs are reported by the patient    General appears well  Eyes no icterus  Extremities no edema  Skin no apparent rashes    This encounter was conducted via Zoom  Video Virtual Visit.   Verbal consent was obtained. Patient's identity was verified.    We reviewed in person the most recent labs  CBC Chem WNL LDL 33  Assessment & Plan     1. Bilateral carotid " artery stenosis moderate 2018     2. CHF (congestive heart failure), NYHA class II, chronic, systolic (HCC) EF 45% 2018  EC-ECHOCARDIOGRAM COMPLETE W/O CONT   3. Essential hypertension, benign  metoprolol SR (TOPROL XL) 25 MG TABLET SR 24 HR   4. Ischemic cardiomyopathy - EF 45% 2018  apixaban (ELIQUIS) 5mg Tab    EC-ECHOCARDIOGRAM COMPLETE W/O CONT   5. VSD (ventricular septal defect) s/p repair  EC-ECHOCARDIOGRAM COMPLETE W/O CONT   6. TIA (transient ischemic attack) 2019 and prior based on clinical history     7. S/P CABG x 1  EC-ECHOCARDIOGRAM COMPLETE W/O CONT   8. Previous inferior myocardial infarction 2013 - complicated by VSD s/p CABG and VSD repair     9. Essential hypertension     10. Coronary artery disease due to calcified coronary lesion  EC-ECHOCARDIOGRAM COMPLETE W/O CONT   11. Chronic anticoagulation         Medical Decision Making: Today's Assessment/Status/Plan:         It was my pleasure to meet with Mr. Salamanca.    We addressed the management of hypertension at today's visit. Blood pressure is well controlled.  We specifically assessed the labs on hypertension treatment    We addressed the management of dyslipidemia at today's visit. He is on appropriate statin.  We addressed the management of chronic anticoagulation at today's visit. He understands the risks and benefits of chronic anticoagulation.  We reviewed and verified the results of annual testing for anemia and kidney function.  For TIA    I will see Mr. Salamanca back in 1 year time and encouraged him to follow up with us over the phone or electronically using my MyChart as issues arise.    It is my pleasure to participate in the care of Mr. Salamanca.  Please do not hesitate to contact me with questions or concerns.    Aj Washington MD PhD FACC  Cardiologist [facility]    Please note that this dictation was created using voice recognition software. There may be errors I did not discover before  finalizing the note.

## 2021-09-15 NOTE — LETTER
[facility]   [address], [address]  [place] [address]  Phone: [phone]  Fax: [phone]              William Salamanca  1935    Encounter Date: 9/15/2021    Aj Washington M.D.      Please send this record to Dr Tamayo in [place]    PROGRESS NOTE:  Chief Complaint   Patient presents with   • Dyslipidemia   • Cardiomyopathy (Ischemic)   • Congestive Heart Failure   • HTN (Controlled)   • Coronary Artery Disease   • Transient Ischemic Attack       Subjective     OLGA Salamanca is a 86 y.o. male who presents today for follow-up of his complex cardiac history including CABG with VSD in 2013 he has had some TIAs subsequent to that on antiplatelet will be switched to Eliquis those resolved which is good it does have a little more expense obviously    We are seeing each other today through the virtual visit worked well no new complaints tolerating his medications well recent labs in May are excellent    Past Medical History:   Diagnosis Date   • Bilateral carotid artery stenosis moderate 2018    • CHF (congestive heart failure), NYHA class II, chronic, systolic (HCC) EF 45% 2018    • Chronic anticoagulation    • Diabetes    • Hypertension    • Ischemic cardiomyopathy - EF 45% 2018    • Previous inferior myocardial infarction 2013 - complicated by VSD s/p CABG and VSD repair 5/31/2013   • S/P CABG x 1 7/19/2013    SVBG-PDA, 3/29/2013, Dr. Escalona    • TIA (transient ischemic attack) 2019 and prior based on clinical history    • VSD (ventricular septal defect) 3/27/2013    [place] Jem patch 3/29/2013, Dr. Escalona    • VSD (ventricular septal defect) s/p repair 3/27/2013    [place] Jem patch 3/29/2013, Dr. Escalona      Past Surgical History:   Procedure Laterality Date   • LUMBAR LAMINECTOMY DISKECTOMY Right 2/16/2018    Procedure: LUMBAR LAMINECTOMY DISKECTOMY- L5-S1;  Surgeon: Celestino Ball M.D.;  Location: SURGERY [facility];  Service:  Neurosurgery   • FORAMINOTOMY  2018    Procedure: FORAMINOTOMY;  Surgeon: Celestino Ball M.D.;  Location: SURGERY [facility];  Service: Neurosurgery   • TOE AMPUTATION  2013    Performed by Khang Orlando M.D. at SURGERY [facility]   • VENTRAL SEPTAL DEFECT REPAIR  3/29/2013    Performed by Miesha Escalona M.D. at SURGERY [facility]   • MULTIPLE CORONARY ARTERY BYPASS ENDO VEIN HARVEST  3/29/2013    Performed by Miesha Escalona M.D. at SURGERY [facility]     Family History   Problem Relation Age of Onset   • Heart Disease Father 77         during heart surgery     Social History     Socioeconomic History   • Marital status:      Spouse name: Not on file   • Number of children: Not on file   • Years of education: Not on file   • Highest education level: Not on file   Occupational History   • Not on file   Tobacco Use   • Smoking status: Former Smoker     Quit date: 1980     Years since quittin.6   • Smokeless tobacco: Never Used   Substance and Sexual Activity   • Alcohol use: No   • Drug use: No   • Sexual activity: Not on file   Other Topics Concern   • Not on file   Social History Narrative   • Not on file     Social Determinants of Health     Financial Resource Strain:    • Difficulty of Paying Living Expenses:    Food Insecurity:    • Worried About Running Out of Food in the Last Year:    • Ran Out of Food in the Last Year:    Transportation Needs:    • Lack of Transportation (Medical):    • Lack of Transportation (Non-Medical):    Physical Activity:    • Days of Exercise per Week:    • Minutes of Exercise per Session:    Stress:    • Feeling of Stress :    Social Connections:    • Frequency of Communication with Friends and Family:    • Frequency of Social Gatherings with Friends and Family:    • Attends [employer] Services:    • Active Member of Clubs or Organizations:    • Attends Club or Organization Meetings:    • Marital Status:    Intimate Partner  Violence:    • Fear of Current or Ex-Partner:    • Emotionally Abused:    • Physically Abused:    • Sexually Abused:      Allergies   Allergen Reactions   • Morphine Sulfate Vomiting     [id number]     Outpatient Encounter Medications as of 9/15/2021   Medication Sig Dispense Refill   • calcium citrate (CALCIUM CITRATE) 950 (200 Ca) MG Tab Take 950 mg by mouth every day.     • Misc Natural Products (OSTEO BI-FLEX ADV JOINT SHIELD PO) Take  by mouth.     • acetaminophen (TYLENOL) 650 MG Suppos Insert 650 mg into the rectum every four hours as needed.     • metoprolol SR (TOPROL XL) 25 MG TABLET SR 24 HR Take 1 Tablet by mouth every day. 90 Tablet 3   • apixaban (ELIQUIS) 5mg Tab Take 1 Tablet by mouth 2 times a day. 180 Tablet 3   • losartan (COZAAR) 50 MG Tab TAKE 1 TABLET DAILY. 90 Tab 3   • amLODIPine (NORVASC) 5 MG Tab TAKE 1 TABLET DAILY. 90 Tab 3   • rosuvastatin (CRESTOR) 20 MG Tab TAKE ONE TABLET EVERY EVENING. 90 Tab 3   • hydrochlorothiazide (MICROZIDE) 12.5 MG capsule TAKE ONE CAPSULE ORALLY EACH DAY. 90 Cap 3   • LUMIGAN 0.01 % Solution      • magnesium oxide (MAG-OX) 400 MG Tab tablet Take 400 mg by mouth every day.     • hydroCHLOROthiazide (HYDRODIURIL) 12.5 MG tablet Take 1 Tab by mouth every day. 90 Tab 2   • doxazosin (CARDURA) 4 MG Tab Take 1 Tab by mouth every day. 90 Tab 3   • zolpidem (AMBIEN) 10 MG TABS Take 5 mg by mouth at bedtime as needed.     • B Complex-Folic Acid (B COMPLEX-VITAMIN B12 PO) Take 1 Tab by mouth every day.     • [DISCONTINUED] fluorouracil (EFUDEX) 5 % cream  (Patient not taking: Reported on 9/15/2021)     • [DISCONTINUED] apixaban (ELIQUIS) 5mg Tab Take 1 Tab by mouth 2 Times a Day. 180 Tab 3   • brimonidine (ALPHAGAN) 0.2 % Solution  (Patient not taking: Reported on 9/15/2021)     • [DISCONTINUED] Coenzyme Q10 300 MG Cap Take 1 Cap by mouth every day. (Patient not taking: Reported on 9/15/2021) 30 Cap    • [DISCONTINUED] metoprolol SR (TOPROL XL) 25 MG TABLET SR 24 HR Take  "1 Tab by mouth every day. 90 Tab 1   • [DISCONTINUED] aspirin (ASA) 81 MG Chew Tab chewable tablet Take 1 Tab by mouth every day. (Patient not taking: Reported on 9/15/2021) 100 Tab    • [DISCONTINUED] methocarbamol (ROBAXIN) 750 MG Tab Take 1 Tab by mouth 3 times a day. (Patient not taking: Reported on 9/15/2021) 90 Tab 1   • [DISCONTINUED] ibuprofen (MOTRIN) 200 MG Tab Take 200 mg by mouth every 8 hours as needed. (Patient not taking: Reported on 9/15/2021)     • [DISCONTINUED] hydrocodone-acetaminophen (NORCO) 5-325 MG Tab per tablet Take 1-2 Tabs by mouth every four hours as needed. (Patient not taking: Reported on 9/15/2021)     • Calcium Carb-Cholecalciferol (CALCIUM 1000 + D PO) Take 1 Tab by mouth every day. (Patient not taking: Reported on 9/15/2021)     • [DISCONTINUED] Multiple Vitamin (MULTI VITAMIN MENS) TABS Take 1 Tab by mouth every day. (Patient not taking: Reported on 9/15/2021)     • [DISCONTINUED] ascorbic acid (ASCORBIC ACID) 500 MG TABS Take 500 mg by mouth every day. (Patient not taking: Reported on 9/15/2021)       No facility-administered encounter medications on file as of 9/15/2021.     Review of Systems   Constitutional: Negative for chills and fever.   HENT: Negative for sore throat.    Eyes: Negative for blurred vision.   Respiratory: Negative for cough and shortness of breath.    Cardiovascular: Negative for chest pain, palpitations, claudication, leg swelling and PND.   Gastrointestinal: Negative for abdominal pain and nausea.   Musculoskeletal: Negative for falls and joint pain.   Skin: Negative for rash.   Neurological: Negative for dizziness, focal weakness and weakness.   Endo/Heme/Allergies: Does not bruise/bleed easily.              Objective     /74 (BP Location: Left arm, Patient Position: Sitting, BP Cuff Size: Adult)   Pulse 61   Ht 1.778 m (5' 10\")   Wt 78.5 kg (173 lb)   SpO2 95%   BMI 24.82 kg/m²     Physical Exam       Vital signs are reported by the " patient    General appears well  Eyes no icterus  Extremities no edema  Skin no apparent rashes    This encounter was conducted via Zoom  Video Virtual Visit.   Verbal consent was obtained. Patient's identity was verified.    We reviewed in person the most recent labs  CBC Chem WNL LDL 33  Assessment & Plan     1. Bilateral carotid artery stenosis moderate 2018     2. CHF (congestive heart failure), NYHA class II, chronic, systolic (HCC) EF 45% 2018  EC-ECHOCARDIOGRAM COMPLETE W/O CONT   3. Essential hypertension, benign  metoprolol SR (TOPROL XL) 25 MG TABLET SR 24 HR   4. Ischemic cardiomyopathy - EF 45% 2018  apixaban (ELIQUIS) 5mg Tab    EC-ECHOCARDIOGRAM COMPLETE W/O CONT   5. VSD (ventricular septal defect) s/p repair  EC-ECHOCARDIOGRAM COMPLETE W/O CONT   6. TIA (transient ischemic attack) 2019 and prior based on clinical history     7. S/P CABG x 1  EC-ECHOCARDIOGRAM COMPLETE W/O CONT   8. Previous inferior myocardial infarction 2013 - complicated by VSD s/p CABG and VSD repair     9. Essential hypertension     10. Coronary artery disease due to calcified coronary lesion  EC-ECHOCARDIOGRAM COMPLETE W/O CONT   11. Chronic anticoagulation         Medical Decision Making: Today's Assessment/Status/Plan:         It was my pleasure to meet with Mr. Salamanca.    We addressed the management of hypertension at today's visit. Blood pressure is well controlled.  We specifically assessed the labs on hypertension treatment    We addressed the management of dyslipidemia at today's visit. He is on appropriate statin.  We addressed the management of chronic anticoagulation at today's visit. He understands the risks and benefits of chronic anticoagulation.  We reviewed and verified the results of annual testing for anemia and kidney function.  For TIA    I will see Mr. Salamanca back in 1 year time and encouraged him to follow up with us over the phone or electronically using my MyChart as issues arise.    It is my pleasure to  participate in the care of Mr. Salamanca.  Please do not hesitate to contact me with questions or concerns.    Aj Washington MD PhD [facility]  Cardiologist [facility]    Please note that this dictation was created using voice recognition software. There may be errors I did not discover before finalizing the note.                           No Recipients

## 2021-12-08 DIAGNOSIS — I10 ESSENTIAL HYPERTENSION, BENIGN: ICD-10-CM

## 2021-12-13 RX ORDER — AMLODIPINE BESYLATE 5 MG/1
TABLET ORAL
Qty: 90 TABLET | Refills: 3 | Status: SHIPPED | OUTPATIENT
Start: 2021-12-13 | End: 2022-09-07 | Stop reason: SDUPTHER

## 2021-12-13 RX ORDER — LOSARTAN POTASSIUM 50 MG/1
TABLET ORAL
Qty: 90 TABLET | Refills: 3 | Status: SHIPPED | OUTPATIENT
Start: 2021-12-13 | End: 2022-09-07 | Stop reason: SDUPTHER

## 2021-12-16 DIAGNOSIS — I10 ESSENTIAL HYPERTENSION: ICD-10-CM

## 2021-12-16 DIAGNOSIS — E78.5 DYSLIPIDEMIA: Chronic | ICD-10-CM

## 2021-12-17 RX ORDER — ROSUVASTATIN CALCIUM 20 MG/1
TABLET, COATED ORAL
Qty: 90 TABLET | Refills: 3 | Status: SHIPPED | OUTPATIENT
Start: 2021-12-17 | End: 2022-09-07 | Stop reason: SDUPTHER

## 2021-12-17 RX ORDER — HYDROCHLOROTHIAZIDE 12.5 MG/1
CAPSULE, GELATIN COATED ORAL
Qty: 90 CAPSULE | Refills: 3 | Status: SHIPPED | OUTPATIENT
Start: 2021-12-17 | End: 2022-09-07

## 2022-01-13 ENCOUNTER — TELEPHONE (OUTPATIENT)
Dept: CARDIOLOGY | Facility: MEDICAL CENTER | Age: 87
End: 2022-01-13

## 2022-01-13 RX ORDER — IRBESARTAN 150 MG/1
150 TABLET ORAL DAILY
Qty: 90 TABLET | Refills: 3 | Status: SHIPPED | OUTPATIENT
Start: 2022-01-13 | End: 2022-09-07

## 2022-01-13 NOTE — TELEPHONE ENCOUNTER
"Received fax from [facility] requesting alternative to Losartan rx as it is unavailable at the time.  They are requesting for \"alternative: valsartan, irbesartan, olmesartan, telmisartan, etc\"    Clarification request relayed to MD for advise.    Fax sent to scanning via [employer] for reference.  "

## 2022-01-14 NOTE — TELEPHONE ENCOUNTER
Noted MD response.    Returned pt call and reviewed MD recommendations.  He verbalizes understanding and states no other concerns or questions at this time.  Pt is appreciative of information given.

## 2022-08-15 ENCOUNTER — TELEPHONE (OUTPATIENT)
Dept: CARDIOLOGY | Facility: MEDICAL CENTER | Age: 87
End: 2022-08-15
Payer: MEDICARE

## 2022-08-15 NOTE — TELEPHONE ENCOUNTER
Attempted to call Robina [phone], unable to reach.  Left detailed voicemail to request for records from [facility]: P: [phone] F: [phone].   Awaiting for clearance request to be received.

## 2022-08-15 NOTE — TELEPHONE ENCOUNTER
CW        Caller: Robina from [facility]  Topic/issue: Their office called and were requesting the last appointment notes and any cardiac imaging information. They stated they would be sending a surgical request for this patient knee arthroscopy for a tentative date of 8/23/22  Callback Number: [phone]  Fax# [phone]    Thank you    -Milan WISDOM

## 2022-08-17 NOTE — TELEPHONE ENCOUNTER
Caller:- Robina from [facility]    Topic/issue: Robina returned call. - She has sent clearance today.     Callback Number: [phone]    Thank you,   Lucrecia BLEDSOE

## 2022-08-17 NOTE — TELEPHONE ENCOUNTER
To CW, please advise on surgery, thank you    Last OV: 9/15/2022  Blood thinner: eliquis  No hx VSD repair 2013, CABG 2013    ===============    Received fax from [facility] (P: [phone] F: [phone]) requesting:    - cardiac clearance for upcoming Left knee arthroscopy partial menicartomy loose body other procedure as indicated scheduled, no date scheduled.  - Eliquis hold 3 days prior to procedure.    Fax sent to scanning for reference via [employer] completed status received.

## 2022-08-18 NOTE — TELEPHONE ENCOUNTER
He can proceed with the proposed procedure or surgery from a cardiac stanpoint, no modifiable cardiovascular risk, no further cardiac testing required, hold anticoagulation as necessary.    It is my pleasure to participate in the care of Mr. Salamanca.  Please do not hesitate to contact me with questions or concerns. [facility] is available 24/7 for consultative services at [phone] in the perioperative period.    Electronically Signed    Aj Washington MD PhD [facility]  Cardiologist [facility]    Please note that this dictation was created using voice recognition software. There may be errors I did not discover before finalizing the note.

## 2022-09-07 ENCOUNTER — TELEMEDICINE (OUTPATIENT)
Dept: CARDIOLOGY | Facility: MEDICAL CENTER | Age: 87
End: 2022-09-07
Payer: MEDICARE

## 2022-09-07 VITALS
WEIGHT: 175 LBS | HEIGHT: 70 IN | OXYGEN SATURATION: 93 % | BODY MASS INDEX: 25.05 KG/M2 | DIASTOLIC BLOOD PRESSURE: 68 MMHG | SYSTOLIC BLOOD PRESSURE: 120 MMHG

## 2022-09-07 DIAGNOSIS — I50.22 CHF (CONGESTIVE HEART FAILURE), NYHA CLASS II, CHRONIC, SYSTOLIC (HCC): Chronic | ICD-10-CM

## 2022-09-07 DIAGNOSIS — Z95.1 S/P CABG X 1: ICD-10-CM

## 2022-09-07 DIAGNOSIS — G45.9 TIA (TRANSIENT ISCHEMIC ATTACK): Chronic | ICD-10-CM

## 2022-09-07 DIAGNOSIS — Q21.0 VENTRICULAR SEPTAL DEFECT: Chronic | ICD-10-CM

## 2022-09-07 DIAGNOSIS — I25.5 ISCHEMIC CARDIOMYOPATHY: Chronic | ICD-10-CM

## 2022-09-07 DIAGNOSIS — I65.23 BILATERAL CAROTID ARTERY STENOSIS: Chronic | ICD-10-CM

## 2022-09-07 DIAGNOSIS — I25.2 PREVIOUS INFERIOR MYOCARDIAL INFARCTION OLDER THAN 8 WEEKS: Chronic | ICD-10-CM

## 2022-09-07 DIAGNOSIS — Z79.01 CHRONIC ANTICOAGULATION: Chronic | ICD-10-CM

## 2022-09-07 DIAGNOSIS — E78.5 DYSLIPIDEMIA: Chronic | ICD-10-CM

## 2022-09-07 DIAGNOSIS — I10 ESSENTIAL HYPERTENSION, BENIGN: ICD-10-CM

## 2022-09-07 PROCEDURE — 99214 OFFICE O/P EST MOD 30 MIN: CPT | Mod: 95 | Performed by: INTERNAL MEDICINE

## 2022-09-07 RX ORDER — AMLODIPINE BESYLATE 5 MG/1
5 TABLET ORAL DAILY
Qty: 90 TABLET | Refills: 3 | Status: SHIPPED | OUTPATIENT
Start: 2022-09-07 | End: 2023-10-17

## 2022-09-07 RX ORDER — HYDROCODONE BITARTRATE AND ACETAMINOPHEN 5; 325 MG/1; MG/1
TABLET ORAL
COMMUNITY
Start: 2022-08-23 | End: 2023-08-30

## 2022-09-07 RX ORDER — METOPROLOL SUCCINATE 25 MG/1
25 TABLET, EXTENDED RELEASE ORAL DAILY
Qty: 90 TABLET | Refills: 3 | Status: SHIPPED | OUTPATIENT
Start: 2022-09-07 | End: 2023-10-17

## 2022-09-07 RX ORDER — LOSARTAN POTASSIUM 50 MG/1
50 TABLET ORAL DAILY
Qty: 90 TABLET | Refills: 3 | Status: SHIPPED | OUTPATIENT
Start: 2022-09-07 | End: 2023-09-22

## 2022-09-07 RX ORDER — ROSUVASTATIN CALCIUM 20 MG/1
TABLET, COATED ORAL
Qty: 90 TABLET | Refills: 3 | Status: SHIPPED | OUTPATIENT
Start: 2022-09-07 | End: 2023-09-13

## 2022-09-13 NOTE — PROGRESS NOTES
Chief Complaint   Patient presents with    Aortic Stenosis     F/V Dx: Bilateral carotid artery stenosis moderate 2018       Subjective     OLGA Salamanca is a 87 y.o. male who presents today for follow-up of his history of CABG with VSD TIAs    He is done over the past year tolerating his medications well no new health issues    Past Medical History:   Diagnosis Date    Bilateral carotid artery stenosis moderate 2018     CHF (congestive heart failure), NYHA class II, chronic, systolic (HCC) EF 45%      Chronic anticoagulation     Diabetes     Hypertension     Ischemic cardiomyopathy - EF 45%      Previous inferior myocardial infarction  - complicated by VSD s/p CABG and VSD repair 2013    S/P CABG x 1 2013    SVBG-PDA, 3/29/2013, Dr. Escalona     TIA (transient ischemic attack)  and prior based on clinical history     VSD (ventricular septal defect) 3/27/2013    [place] Jem patch 3/29/2013, Dr. Escalona     VSD (ventricular septal defect) s/p repair 3/27/2013    [place] Jem patch 3/29/2013, Dr. Escalona      Past Surgical History:   Procedure Laterality Date    LUMBAR LAMINECTOMY DISKECTOMY Right 2018    Procedure: LUMBAR LAMINECTOMY DISKECTOMY- L5-S1;  Surgeon: Celestino Ball M.D.;  Location: SURGERY [facility];  Service: Neurosurgery    FORAMINOTOMY  2018    Procedure: FORAMINOTOMY;  Surgeon: Celestino Ball M.D.;  Location: SURGERY [facility];  Service: Neurosurgery    TOE AMPUTATION  2013    Performed by Khang Orlando M.D. at [facility]    VENTRAL SEPTAL DEFECT REPAIR  3/29/2013    Performed by Miesha Escalona M.D. at SURGERY [facility]    MULTIPLE CORONARY ARTERY BYPASS ENDO VEIN HARVEST  3/29/2013    Performed by Miesha Escalona M.D. at SURGERY [facility]     Family History   Problem Relation Age of Onset    Heart Disease Father 77         during heart surgery     Social History     Socioeconomic History    Marital status:       Spouse name: Not on file    Number of children: Not on file    Years of education: Not on file    Highest education level: Not on file   Occupational History    Not on file   Tobacco Use    Smoking status: Former     Types: Cigarettes     Quit date: 1980     Years since quittin.6    Smokeless tobacco: Never   Vaping Use    Vaping Use: Not on file   Substance and Sexual Activity    Alcohol use: Yes     Alcohol/week: 0.6 oz     Types: 1 Cans of beer per week     Comment: beer once in a while    Drug use: No    Sexual activity: Not on file   Other Topics Concern    Not on file   Social History Narrative    Not on file     Social Determinants of Health     Financial Resource Strain: Not on file   Food Insecurity: Not on file   Transportation Needs: Not on file   Physical Activity: Not on file   Stress: Not on file   Social Connections: Not on file   Intimate Partner Violence: Not on file   Housing Stability: Not on file     Allergies   Allergen Reactions    Morphine Sulfate Vomiting     [id number]     Outpatient Encounter Medications as of 2022   Medication Sig Dispense Refill    HYDROcodone-acetaminophen (NORCO) 5-325 MG Tab per tablet       metoprolol SR (TOPROL XL) 25 MG TABLET SR 24 HR Take 1 Tablet by mouth every day. 90 Tablet 3    rosuvastatin (CRESTOR) 20 MG Tab TAKE ONE TABLET EVERY EVENING. 90 Tablet 3    apixaban (ELIQUIS) 5mg Tab Take 1 Tablet by mouth 2 times a day. 180 Tablet 3    amLODIPine (NORVASC) 5 MG Tab Take 1 Tablet by mouth every day. 90 Tablet 3    losartan (COZAAR) 50 MG Tab Take 1 Tablet by mouth every day. 90 Tablet 3    calcium citrate (CALCITRATE) 950 (200 Ca) MG Tab Take 950 mg by mouth every day.      Misc Natural Products (OSTEO BI-FLEX ADV JOINT SHIELD PO) Take  by mouth.      acetaminophen (TYLENOL) 650 MG Suppos Insert 650 mg into the rectum every four hours as needed.      LUMIGAN 0.01 % Solution       brimonidine (ALPHAGAN) 0.2 % Solution       magnesium oxide  "(MAG-OX) 400 MG Tab tablet Take 400 mg by mouth every day.      hydroCHLOROthiazide (HYDRODIURIL) 12.5 MG tablet Take 1 Tab by mouth every day. 90 Tab 2    doxazosin (CARDURA) 4 MG Tab Take 1 Tab by mouth every day. 90 Tab 3    zolpidem (AMBIEN) 10 MG Tab Take 5 mg by mouth at bedtime as needed.      Calcium Carb-Cholecalciferol (CALCIUM 1000 + D PO) Take 1 Tablet by mouth every day.      B Complex-Folic Acid (B COMPLEX-VITAMIN B12 PO) Take 1 Tab by mouth every day.      [DISCONTINUED] irbesartan (AVAPRO) 150 MG Tab Take 1 Tablet by mouth every day. 90 Tablet 3    [DISCONTINUED] hydrochlorothiazide (MICROZIDE) 12.5 MG capsule TAKE ONE CAPSULE ORALLY EACH DAY. (Patient not taking: Reported on 9/7/2022) 90 Capsule 3    [DISCONTINUED] rosuvastatin (CRESTOR) 20 MG Tab TAKE ONE TABLET EVERY EVENING. 90 Tablet 3    [DISCONTINUED] amLODIPine (NORVASC) 5 MG Tab TAKE 1 TABLET DAILY. 90 Tablet 3    [DISCONTINUED] losartan (COZAAR) 50 MG Tab TAKE 1 TABLET DAILY. 90 Tablet 3    [DISCONTINUED] metoprolol SR (TOPROL XL) 25 MG TABLET SR 24 HR Take 1 Tablet by mouth every day. 90 Tablet 3    [DISCONTINUED] apixaban (ELIQUIS) 5mg Tab Take 1 Tablet by mouth 2 times a day. 180 Tablet 3     No facility-administered encounter medications on file as of 9/7/2022.     ROS           Objective     /68 (BP Location: Left arm, Patient Position: Sitting, BP Cuff Size: Adult)   Ht 1.778 m (5' 10\")   Wt 79.4 kg (175 lb)   SpO2 93%   BMI 25.11 kg/m²     Physical Exam     Vital signs are reported by the patient    General appears well  Eyes no icterus  Extremities no edema  Skin no apparent rashes    This encounter was conducted via Zoom  Video Virtual Visit.   The patient was at home [place] the [place] [facility] [place]  Verbal consent was obtained. Patient's identity was verified.    We reviewed in person the most recent labs        Assessment & Plan     1. Ischemic cardiomyopathy - EF 45% 2018  apixaban (ELIQUIS) 5mg Tab "      2. Previous inferior myocardial infarction 2013 - complicated by VSD s/p CABG and VSD repair        3. S/P CABG x 1        4. TIA (transient ischemic attack) 2019 and prior based on clinical history        5. Chronic anticoagulation        6. CHF (congestive heart failure), NYHA class II, chronic, systolic (HCC) EF 45% 2018        7. Bilateral carotid artery stenosis moderate 2018        8. VSD (ventricular septal defect) s/p repair        9. Essential hypertension, benign  metoprolol SR (TOPROL XL) 25 MG TABLET SR 24 HR    amLODIPine (NORVASC) 5 MG Tab    losartan (COZAAR) 50 MG Tab      10. Dyslipidemia  rosuvastatin (CRESTOR) 20 MG Tab          Medical Decision Making: Today's Assessment/Status/Plan:        It was my pleasure to meet with Mr. Salamanca.  We addressed the management of hypertension at today's visit. Blood pressure is well controlled.  We specifically assessed the labs on hypertension treatment  We addressed the management of dyslipidemia at today's visit. He is on appropriate statin.    We addressed the management of chronic anticoagulation at today's visit. He understands the risks and benefits of chronic anticoagulation.  We reviewed and verified the results of annual testing for anemia and kidney function.    I will see Mr. Salamanca back in 1 year time and encouraged him to follow up with us over the phone or electronically using my MyChart as issues arise.    It is my pleasure to participate in the care of Mr. Salamanca.  Please do not hesitate to contact me with questions or concerns.    Aj Washington MD PhD FAC  Cardiologist [facility]    Please note that this dictation was created using voice recognition software. There may be errors I did not discover before finalizing the note.

## 2022-12-13 DIAGNOSIS — I25.5 ISCHEMIC CARDIOMYOPATHY: Chronic | ICD-10-CM

## 2022-12-14 RX ORDER — HYDROCHLOROTHIAZIDE 12.5 MG/1
12.5 CAPSULE, GELATIN COATED ORAL DAILY
Qty: 90 CAPSULE | Refills: 2 | Status: SHIPPED | OUTPATIENT
Start: 2022-12-14 | End: 2023-12-27

## 2022-12-14 NOTE — TELEPHONE ENCOUNTER
Is the patient due for a refill? Yes    Was the patient seen the past year? Yes    Date of last office visit: 9/7/22    Does the patient have an upcoming appointment?  No   If yes, When?     Provider to refill:CW    Does the patients insurance require a 100 day supply?  No

## 2023-01-12 DIAGNOSIS — I10 ESSENTIAL HYPERTENSION: ICD-10-CM

## 2023-01-13 NOTE — TELEPHONE ENCOUNTER
Is the patient due for a refill? No    irbesartan (AVAPRO) 150 MG Tab (Discontinued) 90 Tablet 3/3 1/13/2022 9/7/2022     Provider to refill: SHERRIE

## 2023-01-16 RX ORDER — IRBESARTAN 150 MG/1
TABLET ORAL
Qty: 90 TABLET | Refills: 0 | OUTPATIENT
Start: 2023-01-16

## 2023-06-05 LAB — LDLC SERPL CALC-MCNC: 37 MG/DL

## 2023-07-28 ENCOUNTER — TELEPHONE (OUTPATIENT)
Dept: VASCULAR SURGERY | Facility: MEDICAL CENTER | Age: 88
End: 2023-07-28
Payer: MEDICARE

## 2023-08-03 DIAGNOSIS — I25.10 CORONARY ARTERY DISEASE DUE TO CALCIFIED CORONARY LESION: ICD-10-CM

## 2023-08-03 DIAGNOSIS — I25.84 CORONARY ARTERY DISEASE DUE TO CALCIFIED CORONARY LESION: ICD-10-CM

## 2023-08-10 ENCOUNTER — OFFICE VISIT (OUTPATIENT)
Dept: VASCULAR SURGERY | Facility: MEDICAL CENTER | Age: 88
End: 2023-08-10
Payer: MEDICARE

## 2023-08-10 ENCOUNTER — HOSPITAL ENCOUNTER (OUTPATIENT)
Dept: RADIOLOGY | Facility: MEDICAL CENTER | Age: 88
End: 2023-08-10
Attending: SPECIALIST

## 2023-08-10 VITALS
SYSTOLIC BLOOD PRESSURE: 132 MMHG | TEMPERATURE: 97.6 F | BODY MASS INDEX: 22.93 KG/M2 | HEART RATE: 58 BPM | DIASTOLIC BLOOD PRESSURE: 68 MMHG | WEIGHT: 163.8 LBS | OXYGEN SATURATION: 98 % | HEIGHT: 71 IN

## 2023-08-10 DIAGNOSIS — I65.22 CAROTID STENOSIS, ASYMPTOMATIC, LEFT: ICD-10-CM

## 2023-08-10 DIAGNOSIS — I65.21 CAROTID STENOSIS, ASYMPTOMATIC, RIGHT: ICD-10-CM

## 2023-08-10 DIAGNOSIS — I10 PRIMARY HYPERTENSION: ICD-10-CM

## 2023-08-10 DIAGNOSIS — E78.5 DYSLIPIDEMIA: ICD-10-CM

## 2023-08-10 PROCEDURE — 3078F DIAST BP <80 MM HG: CPT | Performed by: SURGERY

## 2023-08-10 PROCEDURE — 99204 OFFICE O/P NEW MOD 45 MIN: CPT | Performed by: SURGERY

## 2023-08-10 PROCEDURE — 3075F SYST BP GE 130 - 139MM HG: CPT | Performed by: SURGERY

## 2023-08-10 NOTE — PROGRESS NOTES
VASCULAR SURGERY SERVICE  CONSULT NOTE      Date: 8/10/2023    Referring Provider: Ze Tamayo MD    Consulting Physician: Nicol Sanchez MD - [facility]     -------------------------------------------------------------------------------------------------    Reason for consultation:  Carotid stenosis.    HPI:  This is a pleasant 88 years old right-handed male with multiple medical problems who on recent carotid duplex was found to have greater than 70% right internal carotid artery stenosis and 50 to 69% left internal carotid artery stenosis.  Patient denies any neurological symptoms except for his chronic hard of hearing.  He has remote history of transient ischemic attack many years ago but he does not remember the symptoms that he had.  He has been tobacco free since 1981.    Past Medical History:   Diagnosis Date    Bilateral carotid artery stenosis moderate 2018     CHF (congestive heart failure), NYHA class II, chronic, systolic (HCC) EF 45% 2018     Chronic anticoagulation     Diabetes     Hypertension     Ischemic cardiomyopathy - EF 45% 2018     Previous inferior myocardial infarction 2013 - complicated by VSD s/p CABG and VSD repair 5/31/2013    S/P CABG x 1 7/19/2013    SVBG-PDA, 3/29/2013, Dr. Escalona     TIA (transient ischemic attack) 2019 and prior based on clinical history     VSD (ventricular septal defect) 3/27/2013    [place] Jem patch 3/29/2013, Dr. Escalona     VSD (ventricular septal defect) s/p repair 3/27/2013    [place] Jem patch 3/29/2013, Dr. Escalona        Past Surgical History:   Procedure Laterality Date    LUMBAR LAMINECTOMY DISKECTOMY Right 2/16/2018    Procedure: LUMBAR LAMINECTOMY DISKECTOMY- L5-S1;  Surgeon: Celestino Ball M.D.;  Location: SURGERY [facility];  Service: Neurosurgery    FORAMINOTOMY  2/16/2018    Procedure: FORAMINOTOMY;  Surgeon: Celestino Ball M.D.;  Location: SURGERY [facility];  Service: Neurosurgery    TOE AMPUTATION  4/16/2013    Performed  by Khang Orlando M.D. at SURGERY [facility]    VENTRAL SEPTAL DEFECT REPAIR  3/29/2013    Performed by Miesha Escalona M.D. at SURGERY [facility]    MULTIPLE CORONARY ARTERY BYPASS ENDO VEIN HARVEST  3/29/2013    Performed by Miesha Escalona M.D. at SURGERY [facility]       Current Outpatient Medications   Medication Sig Dispense Refill    hydrochlorothiazide (MICROZIDE) 12.5 MG capsule Take 1 Capsule by mouth every day. 90 Capsule 2    HYDROcodone-acetaminophen (NORCO) 5-325 MG Tab per tablet       metoprolol SR (TOPROL XL) 25 MG TABLET SR 24 HR Take 1 Tablet by mouth every day. 90 Tablet 3    rosuvastatin (CRESTOR) 20 MG Tab TAKE ONE TABLET EVERY EVENING. 90 Tablet 3    apixaban (ELIQUIS) 5mg Tab Take 1 Tablet by mouth 2 times a day. 180 Tablet 3    amLODIPine (NORVASC) 5 MG Tab Take 1 Tablet by mouth every day. 90 Tablet 3    losartan (COZAAR) 50 MG Tab Take 1 Tablet by mouth every day. 90 Tablet 3    calcium citrate (CALCITRATE) 950 (200 Ca) MG Tab Take 950 mg by mouth every day.      Misc Natural Products (OSTEO BI-FLEX ADV JOINT SHIELD PO) Take  by mouth.      acetaminophen (TYLENOL) 650 MG Suppos Insert 650 mg into the rectum every four hours as needed.      LUMIGAN 0.01 % Solution       brimonidine (ALPHAGAN) 0.2 % Solution       magnesium oxide (MAG-OX) 400 MG Tab tablet Take 400 mg by mouth every day.      hydroCHLOROthiazide (HYDRODIURIL) 12.5 MG tablet Take 1 Tab by mouth every day. 90 Tab 2    doxazosin (CARDURA) 4 MG Tab Take 1 Tab by mouth every day. 90 Tab 3    zolpidem (AMBIEN) 10 MG Tab Take 5 mg by mouth at bedtime as needed.      Calcium Carb-Cholecalciferol (CALCIUM 1000 + D PO) Take 1 Tablet by mouth every day.      B Complex-Folic Acid (B COMPLEX-VITAMIN B12 PO) Take 1 Tab by mouth every day.       No current facility-administered medications for this visit.       Social History     Socioeconomic History    Marital status:      Spouse name: Not on file    Number  of children: Not on file    Years of education: Not on file    Highest education level: Not on file   Occupational History    Not on file   Tobacco Use    Smoking status: Former     Types: Cigarettes     Quit date: 1980     Years since quittin.5    Smokeless tobacco: Never   Vaping Use    Vaping Use: Not on file   Substance and Sexual Activity    Alcohol use: Yes     Alcohol/week: 0.6 oz     Types: 1 Cans of beer per week     Comment: beer once in a while    Drug use: No    Sexual activity: Not on file   Other Topics Concern    Not on file   Social History Narrative    Not on file     Social Determinants of Health     Financial Resource Strain: Not on file   Food Insecurity: Not on file   Transportation Needs: Not on file   Physical Activity: Not on file   Stress: Not on file   Social Connections: Not on file   Intimate Partner Violence: Not on file   Housing Stability: Not on file       Family History   Problem Relation Age of Onset    Heart Disease Father 77         during heart surgery       Allergies:  Morphine sulfate    Review of Systems:    Constitutional: Negative for fever, chills, weight loss,   HENT:   Positive for hard of hearing  Eyes:    Negative for blurred vision, double vision, or loss of vision  Respiratory:  Negative for cough, hemoptysis, or wheezing    Cardiac:  Negative for chest pain or palpitations or orthopnea  Vascular:  Negative for claudication or rest pain   Gastrointestinal: Negative for nausea, vomiting, or abdominal pain     Negative for hematochezia or melena   Genitourinary: Negative for dysuria, frequency, or hematuria   Musculoskeletal: Negative for myalgias, back pain, or joint pain  Skin:   Negative for itching or rash  Neurological:  Negative for dizziness, headaches, or tremors     Negative for speech disturbance     Negative for extremity weakness or paresthesias  Endo/Heme:  Negative for easy bruising or bleeding  Psychiatric:  Negative for depression, suicidal  "ideas, or hallucinations    Physical Exam:  /68 (BP Location: Left arm, Patient Position: Sitting, BP Cuff Size: Adult)   Pulse (!) 58   Temp 36.4 °C (97.6 °F) (Temporal)   Ht 1.791 m (5' 10.5\")   Wt 74.3 kg (163 lb 12.8 oz)   SpO2 98%     Constitutional: Alert, oriented, no acute distress, wearing hearing aids  HEENT:  Normocephalic and atraumatic, EOMI  Neck:   Supple, no JVD, bruits on left.  Cardiovascular: Regular rate and rhythm  Pulmonary:  Good air entry bilaterally  Abdominal:  Soft, non-tender, non-distended     Aortic impulse not widened  Musculoskeletal: No edema, no tenderness  Neurological:  CN II-XII grossly intact, no focal deficits.  Skin:   Skin is warm and dry. No rash noted.  Psychiatric:  Normal mood and affect.  Lower extremities:    Feet are warm, well-perfused.      Radiology:  Reviewed.    Assessment:  -Severe right carotid artery stenosis.  -Moderate left carotid artery stenosis.  -History of transient ischemic attack.  -Hypertension.  -Dyslipidemia.  -Coronary artery disease.    Plan:  I had a long discussion with patient and his family members.  Study results were reviewed with them.  Since the degree of stenosis on the right side is more than 70%, invasive intervention is indicated.  I discussed with patient and his family members the options of: Right carotid surgery, right carotid stenting, and no invasive intervention along with pros and cons of all approaches.  After a long discussion, patient would like to proceed with right carotid surgery, fully understanding all risks which include, but not limited to, bleeding, infection, nerve injury, stroke, hyperperfusion syndrome, heart attack, and perioperative anesthetic complications.  All questions were answered.  At his request, the procedure will be performed on September 6, 2023, pending operating room availability.  Patient knows to come off Eliquis 2 days prior to surgery.  Patient and his family members know to call 911 " and have patient taken to the emergency room if he developed any neurological symptoms at any time.      iNcol Sanchez MD  [facility] Vascular Surgery   [employer] preferred or call my office [phone]  __________________________________________________________________  Patient:William Pelaez Cheikh   MRN:[id number]   CSN:[id number]

## 2023-08-11 ENCOUNTER — TELEPHONE (OUTPATIENT)
Dept: VASCULAR SURGERY | Facility: MEDICAL CENTER | Age: 88
End: 2023-08-11
Payer: MEDICARE

## 2023-08-11 NOTE — TELEPHONE ENCOUNTER
I called patient's daughter Devin and scheduled procedure with Dr. Sanchez for 9/06 @ 9:30 am. Patient is to check in @ 7:30 am in [facility].     Patient instructed nothing to eat or drink 8 hours prior and he will need a  once discharged from the hospital.     Patient also instructed to stop Eliquis 2 days prior to surgery.

## 2023-08-15 ENCOUNTER — HOSPITAL ENCOUNTER (OUTPATIENT)
Dept: RADIOLOGY | Facility: MEDICAL CENTER | Age: 88
End: 2023-08-15
Payer: MEDICARE

## 2023-08-23 ENCOUNTER — APPOINTMENT (OUTPATIENT)
Dept: ADMISSIONS | Facility: MEDICAL CENTER | Age: 88
DRG: 038 | End: 2023-08-23
Attending: SURGERY
Payer: MEDICARE

## 2023-08-30 ENCOUNTER — PRE-ADMISSION TESTING (OUTPATIENT)
Dept: ADMISSIONS | Facility: MEDICAL CENTER | Age: 88
DRG: 038 | End: 2023-08-30
Attending: SURGERY
Payer: MEDICARE

## 2023-09-05 ENCOUNTER — PRE-ADMISSION TESTING (OUTPATIENT)
Dept: ADMISSIONS | Facility: MEDICAL CENTER | Age: 88
DRG: 038 | End: 2023-09-05
Attending: SURGERY
Payer: MEDICARE

## 2023-09-05 ENCOUNTER — TELEPHONE (OUTPATIENT)
Dept: VASCULAR SURGERY | Facility: MEDICAL CENTER | Age: 88
End: 2023-09-05

## 2023-09-05 ENCOUNTER — ANESTHESIA EVENT (OUTPATIENT)
Dept: SURGERY | Facility: MEDICAL CENTER | Age: 88
DRG: 038 | End: 2023-09-05
Payer: MEDICARE

## 2023-09-05 DIAGNOSIS — Z01.810 PRE-OPERATIVE CARDIOVASCULAR EXAMINATION: ICD-10-CM

## 2023-09-05 DIAGNOSIS — Z01.812 PRE-OPERATIVE LABORATORY EXAMINATION: ICD-10-CM

## 2023-09-05 PROBLEM — E11.9 DIABETES (HCC): Status: ACTIVE | Noted: 2023-08-30

## 2023-09-05 LAB
ABO GROUP BLD: NORMAL
ANION GAP SERPL CALC-SCNC: 9 MMOL/L (ref 7–16)
BASOPHILS # BLD AUTO: 1.4 % (ref 0–1.8)
BASOPHILS # BLD: 0.09 K/UL (ref 0–0.12)
BLD GP AB SCN SERPL QL: NORMAL
BUN SERPL-MCNC: 20 MG/DL (ref 8–22)
CALCIUM SERPL-MCNC: 9.5 MG/DL (ref 8.5–10.5)
CHLORIDE SERPL-SCNC: 104 MMOL/L (ref 96–112)
CO2 SERPL-SCNC: 28 MMOL/L (ref 20–33)
CREAT SERPL-MCNC: 0.97 MG/DL (ref 0.5–1.4)
EOSINOPHIL # BLD AUTO: 0.08 K/UL (ref 0–0.51)
EOSINOPHIL NFR BLD: 1.2 % (ref 0–6.9)
ERYTHROCYTE [DISTWIDTH] IN BLOOD BY AUTOMATED COUNT: 52.8 FL (ref 35.9–50)
GFR SERPLBLD CREATININE-BSD FMLA CKD-EPI: 75 ML/MIN/1.73 M 2
GLUCOSE SERPL-MCNC: 134 MG/DL (ref 65–99)
HCT VFR BLD AUTO: 46.2 % (ref 42–52)
HGB BLD-MCNC: 14.6 G/DL (ref 14–18)
IMM GRANULOCYTES # BLD AUTO: 0.03 K/UL (ref 0–0.11)
IMM GRANULOCYTES NFR BLD AUTO: 0.5 % (ref 0–0.9)
LYMPHOCYTES # BLD AUTO: 1.31 K/UL (ref 1–4.8)
LYMPHOCYTES NFR BLD: 20.3 % (ref 22–41)
MCH RBC QN AUTO: 29.2 PG (ref 27–33)
MCHC RBC AUTO-ENTMCNC: 31.6 G/DL (ref 32.3–36.5)
MCV RBC AUTO: 92.4 FL (ref 81.4–97.8)
MONOCYTES # BLD AUTO: 0.58 K/UL (ref 0–0.85)
MONOCYTES NFR BLD AUTO: 9 % (ref 0–13.4)
NEUTROPHILS # BLD AUTO: 4.37 K/UL (ref 1.82–7.42)
NEUTROPHILS NFR BLD: 67.6 % (ref 44–72)
NRBC # BLD AUTO: 0 K/UL
NRBC BLD-RTO: 0 /100 WBC (ref 0–0.2)
PLATELET # BLD AUTO: 142 K/UL (ref 164–446)
PMV BLD AUTO: 10.7 FL (ref 9–12.9)
POTASSIUM SERPL-SCNC: 4.1 MMOL/L (ref 3.6–5.5)
RBC # BLD AUTO: 5 M/UL (ref 4.7–6.1)
RH BLD: NORMAL
SODIUM SERPL-SCNC: 141 MMOL/L (ref 135–145)
WBC # BLD AUTO: 6.5 K/UL (ref 4.8–10.8)

## 2023-09-05 PROCEDURE — 85025 COMPLETE CBC W/AUTO DIFF WBC: CPT

## 2023-09-05 PROCEDURE — 36415 COLL VENOUS BLD VENIPUNCTURE: CPT

## 2023-09-05 PROCEDURE — 86900 BLOOD TYPING SEROLOGIC ABO: CPT

## 2023-09-05 PROCEDURE — 93005 ELECTROCARDIOGRAM TRACING: CPT

## 2023-09-05 PROCEDURE — 86850 RBC ANTIBODY SCREEN: CPT

## 2023-09-05 PROCEDURE — 80048 BASIC METABOLIC PNL TOTAL CA: CPT

## 2023-09-05 PROCEDURE — 86901 BLOOD TYPING SEROLOGIC RH(D): CPT

## 2023-09-06 ENCOUNTER — ANESTHESIA (OUTPATIENT)
Dept: SURGERY | Facility: MEDICAL CENTER | Age: 88
DRG: 038 | End: 2023-09-06
Payer: MEDICARE

## 2023-09-06 ENCOUNTER — HOSPITAL ENCOUNTER (INPATIENT)
Facility: MEDICAL CENTER | Age: 88
LOS: 1 days | DRG: 038 | End: 2023-09-07
Attending: SURGERY | Admitting: SURGERY
Payer: MEDICARE

## 2023-09-06 DIAGNOSIS — I65.23 BILATERAL CAROTID ARTERY STENOSIS: Chronic | ICD-10-CM

## 2023-09-06 LAB
EKG IMPRESSION: NORMAL
GLUCOSE BLD STRIP.AUTO-MCNC: 119 MG/DL (ref 65–99)

## 2023-09-06 PROCEDURE — C1781 MESH (IMPLANTABLE): HCPCS | Performed by: SURGERY

## 2023-09-06 PROCEDURE — 93010 ELECTROCARDIOGRAM REPORT: CPT | Performed by: INTERNAL MEDICINE

## 2023-09-06 PROCEDURE — 700101 HCHG RX REV CODE 250: Performed by: ANESTHESIOLOGY

## 2023-09-06 PROCEDURE — 700101 HCHG RX REV CODE 250: Performed by: SURGERY

## 2023-09-06 PROCEDURE — 160028 HCHG SURGERY MINUTES - 1ST 30 MINS LEVEL 3: Performed by: SURGERY

## 2023-09-06 PROCEDURE — A9270 NON-COVERED ITEM OR SERVICE: HCPCS | Performed by: SURGERY

## 2023-09-06 PROCEDURE — C1713 ANCHOR/SCREW BN/BN,TIS/BN: HCPCS | Performed by: SURGERY

## 2023-09-06 PROCEDURE — 110371 HCHG SHELL REV 272: Performed by: SURGERY

## 2023-09-06 PROCEDURE — 160002 HCHG RECOVERY MINUTES (STAT): Performed by: SURGERY

## 2023-09-06 PROCEDURE — 770001 HCHG ROOM/CARE - MED/SURG/GYN PRIV*

## 2023-09-06 PROCEDURE — 35301 RECHANNELING OF ARTERY: CPT | Mod: AS,RT | Performed by: NURSE PRACTITIONER

## 2023-09-06 PROCEDURE — 700102 HCHG RX REV CODE 250 W/ 637 OVERRIDE(OP): Performed by: ANESTHESIOLOGY

## 2023-09-06 PROCEDURE — A9270 NON-COVERED ITEM OR SERVICE: HCPCS | Performed by: ANESTHESIOLOGY

## 2023-09-06 PROCEDURE — 160035 HCHG PACU - 1ST 60 MINS PHASE I: Performed by: SURGERY

## 2023-09-06 PROCEDURE — 700102 HCHG RX REV CODE 250 W/ 637 OVERRIDE(OP): Performed by: SURGERY

## 2023-09-06 PROCEDURE — 700105 HCHG RX REV CODE 258: Performed by: ANESTHESIOLOGY

## 2023-09-06 PROCEDURE — 700111 HCHG RX REV CODE 636 W/ 250 OVERRIDE (IP): Performed by: ANESTHESIOLOGY

## 2023-09-06 PROCEDURE — 700111 HCHG RX REV CODE 636 W/ 250 OVERRIDE (IP): Performed by: SURGERY

## 2023-09-06 PROCEDURE — 82962 GLUCOSE BLOOD TEST: CPT

## 2023-09-06 PROCEDURE — 03CK0ZZ EXTIRPATION OF MATTER FROM RIGHT INTERNAL CAROTID ARTERY, OPEN APPROACH: ICD-10-PCS | Performed by: SURGERY

## 2023-09-06 PROCEDURE — 160048 HCHG OR STATISTICAL LEVEL 1-5: Performed by: SURGERY

## 2023-09-06 PROCEDURE — 160039 HCHG SURGERY MINUTES - EA ADDL 1 MIN LEVEL 3: Performed by: SURGERY

## 2023-09-06 PROCEDURE — 160009 HCHG ANES TIME/MIN: Performed by: SURGERY

## 2023-09-06 PROCEDURE — 03UK0KZ SUPPLEMENT RIGHT INTERNAL CAROTID ARTERY WITH NONAUTOLOGOUS TISSUE SUBSTITUTE, OPEN APPROACH: ICD-10-PCS | Performed by: SURGERY

## 2023-09-06 PROCEDURE — 110454 HCHG SHELL REV 250: Performed by: SURGERY

## 2023-09-06 PROCEDURE — 35301 RECHANNELING OF ARTERY: CPT | Mod: RT | Performed by: SURGERY

## 2023-09-06 PROCEDURE — 700105 HCHG RX REV CODE 258: Performed by: SURGERY

## 2023-09-06 DEVICE — PATCH .8X8CM XENOSURE BIOLOGIC VASCULAR---ORDER IN MULTIPLES OF 5---: Type: IMPLANTABLE DEVICE | Site: CAROTID | Status: FUNCTIONAL

## 2023-09-06 RX ORDER — SODIUM CHLORIDE, SODIUM LACTATE, POTASSIUM CHLORIDE, CALCIUM CHLORIDE 600; 310; 30; 20 MG/100ML; MG/100ML; MG/100ML; MG/100ML
INJECTION, SOLUTION INTRAVENOUS CONTINUOUS
Status: DISCONTINUED | OUTPATIENT
Start: 2023-09-06 | End: 2023-09-06

## 2023-09-06 RX ORDER — ZOLPIDEM TARTRATE 5 MG/1
5 TABLET ORAL NIGHTLY PRN
Status: DISCONTINUED | OUTPATIENT
Start: 2023-09-06 | End: 2023-09-07 | Stop reason: HOSPADM

## 2023-09-06 RX ORDER — METOPROLOL TARTRATE 1 MG/ML
1 INJECTION, SOLUTION INTRAVENOUS
Status: DISCONTINUED | OUTPATIENT
Start: 2023-09-06 | End: 2023-09-06 | Stop reason: HOSPADM

## 2023-09-06 RX ORDER — SODIUM CHLORIDE, SODIUM LACTATE, POTASSIUM CHLORIDE, CALCIUM CHLORIDE 600; 310; 30; 20 MG/100ML; MG/100ML; MG/100ML; MG/100ML
INJECTION, SOLUTION INTRAVENOUS CONTINUOUS
Status: ACTIVE | OUTPATIENT
Start: 2023-09-06 | End: 2023-09-06

## 2023-09-06 RX ORDER — AMLODIPINE BESYLATE 5 MG/1
5 TABLET ORAL DAILY
Status: DISCONTINUED | OUTPATIENT
Start: 2023-09-07 | End: 2023-09-07 | Stop reason: HOSPADM

## 2023-09-06 RX ORDER — LATANOPROST 50 UG/ML
1 SOLUTION/ DROPS OPHTHALMIC EVERY EVENING
Status: DISCONTINUED | OUTPATIENT
Start: 2023-09-06 | End: 2023-09-07 | Stop reason: HOSPADM

## 2023-09-06 RX ORDER — GLYCOPYRROLATE 0.2 MG/ML
INJECTION INTRAMUSCULAR; INTRAVENOUS PRN
Status: DISCONTINUED | OUTPATIENT
Start: 2023-09-06 | End: 2023-09-06 | Stop reason: SURG

## 2023-09-06 RX ORDER — LANOLIN ALCOHOL/MO/W.PET/CERES
400 CREAM (GRAM) TOPICAL DAILY
Status: DISCONTINUED | OUTPATIENT
Start: 2023-09-06 | End: 2023-09-07 | Stop reason: HOSPADM

## 2023-09-06 RX ORDER — LABETALOL HYDROCHLORIDE 5 MG/ML
10 INJECTION, SOLUTION INTRAVENOUS EVERY 4 HOURS PRN
Status: DISCONTINUED | OUTPATIENT
Start: 2023-09-06 | End: 2023-09-07 | Stop reason: HOSPADM

## 2023-09-06 RX ORDER — LOSARTAN POTASSIUM 50 MG/1
50 TABLET ORAL
Status: DISCONTINUED | OUTPATIENT
Start: 2023-09-06 | End: 2023-09-07 | Stop reason: HOSPADM

## 2023-09-06 RX ORDER — HYDROCODONE BITARTRATE AND ACETAMINOPHEN 5; 325 MG/1; MG/1
1-2 TABLET ORAL EVERY 6 HOURS PRN
Status: DISCONTINUED | OUTPATIENT
Start: 2023-09-06 | End: 2023-09-07 | Stop reason: HOSPADM

## 2023-09-06 RX ORDER — HYDROCHLOROTHIAZIDE 12.5 MG/1
12.5 TABLET ORAL DAILY
Status: DISCONTINUED | OUTPATIENT
Start: 2023-09-06 | End: 2023-09-07 | Stop reason: HOSPADM

## 2023-09-06 RX ORDER — LIDOCAINE HYDROCHLORIDE 20 MG/ML
INJECTION, SOLUTION EPIDURAL; INFILTRATION; INTRACAUDAL; PERINEURAL PRN
Status: DISCONTINUED | OUTPATIENT
Start: 2023-09-06 | End: 2023-09-06 | Stop reason: SURG

## 2023-09-06 RX ORDER — ONDANSETRON 2 MG/ML
4 INJECTION INTRAMUSCULAR; INTRAVENOUS EVERY 4 HOURS PRN
Status: DISCONTINUED | OUTPATIENT
Start: 2023-09-06 | End: 2023-09-07 | Stop reason: HOSPADM

## 2023-09-06 RX ORDER — ONDANSETRON 2 MG/ML
INJECTION INTRAMUSCULAR; INTRAVENOUS PRN
Status: DISCONTINUED | OUTPATIENT
Start: 2023-09-06 | End: 2023-09-06 | Stop reason: SURG

## 2023-09-06 RX ORDER — SODIUM CHLORIDE, SODIUM GLUCONATE, SODIUM ACETATE, POTASSIUM CHLORIDE AND MAGNESIUM CHLORIDE 526; 502; 368; 37; 30 MG/100ML; MG/100ML; MG/100ML; MG/100ML; MG/100ML
INJECTION, SOLUTION INTRAVENOUS
Status: DISCONTINUED | OUTPATIENT
Start: 2023-09-06 | End: 2023-09-06 | Stop reason: SURG

## 2023-09-06 RX ORDER — HYDROMORPHONE HYDROCHLORIDE 1 MG/ML
0.1 INJECTION, SOLUTION INTRAMUSCULAR; INTRAVENOUS; SUBCUTANEOUS
Status: DISCONTINUED | OUTPATIENT
Start: 2023-09-06 | End: 2023-09-06 | Stop reason: HOSPADM

## 2023-09-06 RX ORDER — CEFAZOLIN SODIUM 1 G/3ML
INJECTION, POWDER, FOR SOLUTION INTRAMUSCULAR; INTRAVENOUS PRN
Status: DISCONTINUED | OUTPATIENT
Start: 2023-09-06 | End: 2023-09-06 | Stop reason: SURG

## 2023-09-06 RX ORDER — MORPHINE SULFATE 4 MG/ML
1-2 INJECTION INTRAVENOUS
Status: DISCONTINUED | OUTPATIENT
Start: 2023-09-06 | End: 2023-09-07 | Stop reason: HOSPADM

## 2023-09-06 RX ORDER — ONDANSETRON 2 MG/ML
4 INJECTION INTRAMUSCULAR; INTRAVENOUS
Status: DISCONTINUED | OUTPATIENT
Start: 2023-09-06 | End: 2023-09-06 | Stop reason: HOSPADM

## 2023-09-06 RX ORDER — HYDROMORPHONE HYDROCHLORIDE 1 MG/ML
0.4 INJECTION, SOLUTION INTRAMUSCULAR; INTRAVENOUS; SUBCUTANEOUS
Status: DISCONTINUED | OUTPATIENT
Start: 2023-09-06 | End: 2023-09-06 | Stop reason: HOSPADM

## 2023-09-06 RX ORDER — HEPARIN SODIUM 1000 [USP'U]/ML
INJECTION, SOLUTION INTRAVENOUS; SUBCUTANEOUS PRN
Status: DISCONTINUED | OUTPATIENT
Start: 2023-09-06 | End: 2023-09-06 | Stop reason: HOSPADM

## 2023-09-06 RX ORDER — HYDROMORPHONE HYDROCHLORIDE 1 MG/ML
0.2 INJECTION, SOLUTION INTRAMUSCULAR; INTRAVENOUS; SUBCUTANEOUS
Status: DISCONTINUED | OUTPATIENT
Start: 2023-09-06 | End: 2023-09-06 | Stop reason: HOSPADM

## 2023-09-06 RX ORDER — PHENYLEPHRINE HCL IN 0.9% NACL 0.5 MG/5ML
SYRINGE (ML) INTRAVENOUS PRN
Status: DISCONTINUED | OUTPATIENT
Start: 2023-09-06 | End: 2023-09-06 | Stop reason: SURG

## 2023-09-06 RX ORDER — PROTAMINE SULFATE 10 MG/ML
INJECTION, SOLUTION INTRAVENOUS PRN
Status: DISCONTINUED | OUTPATIENT
Start: 2023-09-06 | End: 2023-09-06 | Stop reason: SURG

## 2023-09-06 RX ORDER — HYDRALAZINE HYDROCHLORIDE 20 MG/ML
10 INJECTION INTRAMUSCULAR; INTRAVENOUS EVERY 4 HOURS PRN
Status: DISCONTINUED | OUTPATIENT
Start: 2023-09-06 | End: 2023-09-07 | Stop reason: HOSPADM

## 2023-09-06 RX ORDER — DOXAZOSIN 2 MG/1
4 TABLET ORAL
Status: DISCONTINUED | OUTPATIENT
Start: 2023-09-06 | End: 2023-09-07 | Stop reason: HOSPADM

## 2023-09-06 RX ORDER — ROSUVASTATIN CALCIUM 20 MG/1
20 TABLET, COATED ORAL EVERY EVENING
Status: DISCONTINUED | OUTPATIENT
Start: 2023-09-06 | End: 2023-09-07 | Stop reason: HOSPADM

## 2023-09-06 RX ORDER — ACETAMINOPHEN 325 MG/1
650 TABLET ORAL EVERY 6 HOURS PRN
Status: DISCONTINUED | OUTPATIENT
Start: 2023-09-06 | End: 2023-09-07 | Stop reason: HOSPADM

## 2023-09-06 RX ORDER — DEXAMETHASONE SODIUM PHOSPHATE 4 MG/ML
INJECTION, SOLUTION INTRA-ARTICULAR; INTRALESIONAL; INTRAMUSCULAR; INTRAVENOUS; SOFT TISSUE PRN
Status: DISCONTINUED | OUTPATIENT
Start: 2023-09-06 | End: 2023-09-06 | Stop reason: SURG

## 2023-09-06 RX ORDER — HEPARIN SODIUM 5000 [USP'U]/ML
INJECTION, SOLUTION INTRAVENOUS; SUBCUTANEOUS
Status: DISCONTINUED | OUTPATIENT
Start: 2023-09-06 | End: 2023-09-06 | Stop reason: HOSPADM

## 2023-09-06 RX ORDER — LIDOCAINE HYDROCHLORIDE 10 MG/ML
INJECTION, SOLUTION INFILTRATION; PERINEURAL
Status: DISCONTINUED | OUTPATIENT
Start: 2023-09-06 | End: 2023-09-06 | Stop reason: HOSPADM

## 2023-09-06 RX ORDER — METOPROLOL SUCCINATE 25 MG/1
25 TABLET, EXTENDED RELEASE ORAL DAILY
Status: DISCONTINUED | OUTPATIENT
Start: 2023-09-07 | End: 2023-09-07 | Stop reason: HOSPADM

## 2023-09-06 RX ORDER — ASPIRIN 81 MG/1
81 TABLET, CHEWABLE ORAL DAILY
Status: DISCONTINUED | OUTPATIENT
Start: 2023-09-06 | End: 2023-09-07 | Stop reason: HOSPADM

## 2023-09-06 RX ORDER — BUPIVACAINE HYDROCHLORIDE AND EPINEPHRINE 5; 5 MG/ML; UG/ML
INJECTION, SOLUTION EPIDURAL; INTRACAUDAL; PERINEURAL
Status: DISCONTINUED | OUTPATIENT
Start: 2023-09-06 | End: 2023-09-06 | Stop reason: HOSPADM

## 2023-09-06 RX ORDER — DIPHENHYDRAMINE HYDROCHLORIDE 50 MG/ML
12.5 INJECTION INTRAMUSCULAR; INTRAVENOUS
Status: DISCONTINUED | OUTPATIENT
Start: 2023-09-06 | End: 2023-09-06 | Stop reason: HOSPADM

## 2023-09-06 RX ORDER — BRIMONIDINE TARTRATE 2 MG/ML
1 SOLUTION/ DROPS OPHTHALMIC DAILY
Status: DISCONTINUED | OUTPATIENT
Start: 2023-09-06 | End: 2023-09-07 | Stop reason: HOSPADM

## 2023-09-06 RX ORDER — ROCURONIUM BROMIDE 10 MG/ML
INJECTION, SOLUTION INTRAVENOUS PRN
Status: DISCONTINUED | OUTPATIENT
Start: 2023-09-06 | End: 2023-09-06 | Stop reason: SURG

## 2023-09-06 RX ORDER — EPHEDRINE SULFATE 50 MG/ML
5 INJECTION, SOLUTION INTRAVENOUS
Status: DISCONTINUED | OUTPATIENT
Start: 2023-09-06 | End: 2023-09-06 | Stop reason: HOSPADM

## 2023-09-06 RX ORDER — PHENYLEPHRINE HYDROCHLORIDE 10 MG/ML
INJECTION, SOLUTION INTRAMUSCULAR; INTRAVENOUS; SUBCUTANEOUS PRN
Status: DISCONTINUED | OUTPATIENT
Start: 2023-09-06 | End: 2023-09-06

## 2023-09-06 RX ORDER — DOCUSATE SODIUM 100 MG/1
100 CAPSULE, LIQUID FILLED ORAL 2 TIMES DAILY
Status: DISCONTINUED | OUTPATIENT
Start: 2023-09-06 | End: 2023-09-07 | Stop reason: HOSPADM

## 2023-09-06 RX ADMIN — LIDOCAINE HYDROCHLORIDE 60 MG: 20 INJECTION, SOLUTION EPIDURAL; INFILTRATION; INTRACAUDAL at 09:05

## 2023-09-06 RX ADMIN — SODIUM CHLORIDE, POTASSIUM CHLORIDE, SODIUM LACTATE AND CALCIUM CHLORIDE: 600; 310; 30; 20 INJECTION, SOLUTION INTRAVENOUS at 09:00

## 2023-09-06 RX ADMIN — CEFAZOLIN 2 G: 1 INJECTION, POWDER, FOR SOLUTION INTRAMUSCULAR; INTRAVENOUS at 09:05

## 2023-09-06 RX ADMIN — SODIUM CHLORIDE, SODIUM GLUCONATE, SODIUM ACETATE, POTASSIUM CHLORIDE AND MAGNESIUM CHLORIDE: 526; 502; 368; 37; 30 INJECTION, SOLUTION INTRAVENOUS at 10:23

## 2023-09-06 RX ADMIN — HEPARIN SODIUM 5000 UNITS: 1000 INJECTION, SOLUTION INTRAVENOUS; SUBCUTANEOUS at 09:33

## 2023-09-06 RX ADMIN — HYDROCHLOROTHIAZIDE 12.5 MG: 12.5 TABLET ORAL at 18:21

## 2023-09-06 RX ADMIN — HYDRALAZINE HYDROCHLORIDE 10 MG: 20 INJECTION, SOLUTION INTRAMUSCULAR; INTRAVENOUS at 11:05

## 2023-09-06 RX ADMIN — Medication 100 MCG: at 09:34

## 2023-09-06 RX ADMIN — Medication 100 MCG: at 09:22

## 2023-09-06 RX ADMIN — PROPOFOL 120 MG: 10 INJECTION, EMULSION INTRAVENOUS at 09:05

## 2023-09-06 RX ADMIN — ROCURONIUM BROMIDE 40 MG: 50 INJECTION, SOLUTION INTRAVENOUS at 09:05

## 2023-09-06 RX ADMIN — DOCUSATE SODIUM 100 MG: 100 CAPSULE, LIQUID FILLED ORAL at 18:22

## 2023-09-06 RX ADMIN — DEXAMETHASONE SODIUM PHOSPHATE 4 MG: 4 INJECTION INTRA-ARTICULAR; INTRALESIONAL; INTRAMUSCULAR; INTRAVENOUS; SOFT TISSUE at 09:13

## 2023-09-06 RX ADMIN — Medication 100 MCG: at 09:54

## 2023-09-06 RX ADMIN — FENTANYL CITRATE 50 MCG: 50 INJECTION, SOLUTION INTRAMUSCULAR; INTRAVENOUS at 09:00

## 2023-09-06 RX ADMIN — DOXAZOSIN 4 MG: 2 TABLET ORAL at 20:37

## 2023-09-06 RX ADMIN — LOSARTAN POTASSIUM 50 MG: 50 TABLET, FILM COATED ORAL at 20:38

## 2023-09-06 RX ADMIN — HYDROCODONE BITARTRATE AND ACETAMINOPHEN 1 TABLET: 5; 325 TABLET ORAL at 22:41

## 2023-09-06 RX ADMIN — ASPIRIN 81 MG 81 MG: 81 TABLET ORAL at 11:30

## 2023-09-06 RX ADMIN — GLYCOPYRROLATE 0.2 MG: 0.2 INJECTION INTRAMUSCULAR; INTRAVENOUS at 09:34

## 2023-09-06 RX ADMIN — Medication 100 MCG: at 09:45

## 2023-09-06 RX ADMIN — BRIMONIDINE TARTRATE 1 DROP: 2 SOLUTION OPHTHALMIC at 20:37

## 2023-09-06 RX ADMIN — Medication 400 MG: at 18:22

## 2023-09-06 RX ADMIN — SUGAMMADEX 150 MG: 100 INJECTION, SOLUTION INTRAVENOUS at 10:23

## 2023-09-06 RX ADMIN — Medication 100 MCG: at 09:59

## 2023-09-06 RX ADMIN — Medication 100 MCG: at 09:49

## 2023-09-06 RX ADMIN — LATANOPROST 1 DROP: 50 SOLUTION OPHTHALMIC at 20:34

## 2023-09-06 RX ADMIN — PROTAMINE SULFATE 30 MG: 10 INJECTION, SOLUTION INTRAVENOUS at 10:13

## 2023-09-06 RX ADMIN — ONDANSETRON 4 MG: 2 INJECTION INTRAMUSCULAR; INTRAVENOUS at 09:13

## 2023-09-06 RX ADMIN — HYDROCODONE BITARTRATE AND ACETAMINOPHEN 7.5 MG: 7.5; 325 SOLUTION ORAL at 11:56

## 2023-09-06 RX ADMIN — CEFAZOLIN 2 G: 2 INJECTION, POWDER, FOR SOLUTION INTRAMUSCULAR; INTRAVENOUS at 18:28

## 2023-09-06 RX ADMIN — ROSUVASTATIN CALCIUM 20 MG: 20 TABLET, FILM COATED ORAL at 18:21

## 2023-09-06 ASSESSMENT — LIFESTYLE VARIABLES
TOTAL SCORE: 0
ALCOHOL_USE: YES
CONSUMPTION TOTAL: NEGATIVE
ON A TYPICAL DAY WHEN YOU DRINK ALCOHOL HOW MANY DRINKS DO YOU HAVE: 1
AVERAGE NUMBER OF DAYS PER WEEK YOU HAVE A DRINK CONTAINING ALCOHOL: 2
EVER HAD A DRINK FIRST THING IN THE MORNING TO STEADY YOUR NERVES TO GET RID OF A HANGOVER: NO
TOTAL SCORE: 0
HAVE PEOPLE ANNOYED YOU BY CRITICIZING YOUR DRINKING: NO
HOW MANY TIMES IN THE PAST YEAR HAVE YOU HAD 5 OR MORE DRINKS IN A DAY: 0
DOES PATIENT WANT TO STOP DRINKING: NO
EVER FELT BAD OR GUILTY ABOUT YOUR DRINKING: NO
HAVE YOU EVER FELT YOU SHOULD CUT DOWN ON YOUR DRINKING: NO
TOTAL SCORE: 0

## 2023-09-06 ASSESSMENT — PAIN DESCRIPTION - PAIN TYPE
TYPE: ACUTE PAIN
TYPE: SURGICAL PAIN

## 2023-09-06 ASSESSMENT — PAIN SCALES - GENERAL: PAIN_LEVEL: 4

## 2023-09-06 NOTE — ANESTHESIA POSTPROCEDURE EVALUATION
Patient: William Salamanca    Procedure Summary     Date: 09/06/23 Room / Location: [facility] 09 / SURGERY [facility]    Anesthesia Start: 0900 Anesthesia Stop: 1039    Procedure: RIGHT CAROTID ENDARTERECTOMY (Right: Neck) Diagnosis: (SEVERE RIGHT CAROTID STENOSIS)    Surgeons: Nicol Sanchez M.D. Responsible Provider: Rogelio Caraballo M.D.    Anesthesia Type: general ASA Status: 3          Final Anesthesia Type: general  Last vitals  BP   Blood Pressure : (Abnormal) 141/60, Arterial BP: 154/49    Temp   36.7 °C (98 °F)    Pulse   75   Resp   13    SpO2   96 %      Anesthesia Post Evaluation    Patient location during evaluation: PACU  Patient participation: complete - patient participated  Level of consciousness: awake and alert  Pain score: 4    Airway patency: patent  Anesthetic complications: no  Cardiovascular status: hemodynamically stable  Respiratory status: acceptable  Hydration status: euvolemic    PONV: none          No notable events documented.     Nurse Pain Score: 4 (NPRS)

## 2023-09-06 NOTE — ANESTHESIA PREPROCEDURE EVALUATION
Case: [id number] Date/Time: 09/06/23 0715    Procedure: RIGHT CAROTID ENDARTERECTOMY (Right)    Pre-op diagnosis: SEVERE RIGHT CAROTID STENOSIS    Location: [facility] 09 / SURGERY [facility]    Surgeons: Nicol Sanchez M.D.          Relevant Problems   NEURO   (positive) TIA (transient ischemic attack) 2019 and prior based on clinical history      CARDIAC   (positive) Bilateral carotid artery stenosis moderate 2018   (positive) Coronary artery disease due to calcified coronary lesion   (positive) Hypertension   (positive) Previous inferior myocardial infarction 2013 - complicated by VSD s/p CABG and VSD repair         (positive) CKD (chronic kidney disease), stage III (HCC)      Other   (positive) CHF (congestive heart failure), NYHA class II, chronic, systolic (HCC) EF 45% 2018   (positive) Chronic anticoagulation   (positive) Dyslipidemia   (positive) Hyperglycemia   (positive) Ischemic cardiomyopathy - EF 45% 2018   (positive) S/P CABG x 1       Physical Exam    Airway   Mallampati: II  TM distance: >3 FB  Neck ROM: full       Cardiovascular - normal exam  Rhythm: regular  Rate: normal  (-) murmur     Dental - normal exam        Very poor dentition   Pulmonary - normal exam  Breath sounds clear to auscultation     Abdominal    Neurological - normal exam               Anesthesia Plan    ASA 3   ASA physical status 3 criteria: CAD/stents (> 3 months) and CVA or TIA - history (> 3 months)    Plan - general       Airway plan will be ETT        Plan Factors:   Patient was not previously instructed to abstain from smoking on day of procedure.  Patient did not smoke on day of procedure.      Induction: intravenous    Postoperative Plan: Postoperative administration of opioids is intended.    Pertinent diagnostic labs and testing reviewed    Informed Consent:    Anesthetic plan and risks discussed with patient.    Use of blood products discussed with: patient whom consented to blood products.

## 2023-09-06 NOTE — OP REPORT
VASCULAR SURGERY OPERATIVE REPORT       DATE OF SERVICE:  09/06/2023     SURGEON:  Nicol Sanchez MD     ASSISTANT:  ASHLEE Sumner     ANESTHESIOLOGIST:  Rogelio Caraballo MD     TYPE OF ANESTHESIA:  General anesthesia.     PREOPERATIVE DIAGNOSIS:  Severe right carotid artery stenosis.     POSTOPERATIVE DIAGNOSIS:  Critical right carotid artery stenosis.     PROCEDURE:  Right carotid endarterectomy with bovine patch angioplasty.     INDICATIONS FOR PROCEDURE:  This is a pleasant 88-year-old male with multiple   medical problems who was referred to see me for a severe right carotid artery   stenosis.  Discussion was made with the patient and his family member.  They   would like to undergo right carotid surgery, fully understanding all risks.     DESCRIPTION OF PROCEDURE:  Informed consent was obtained.  The patient was   taken to the operating room and was placed in the supine position.  Sequential   compression devices were applied.  The patient was given Ancef intravenously.    General anesthesia was induced.  A radial arterial catheter was placed by the   anesthesiologist for intraoperative blood pressure monitoring.     Next, his right neck and chest were sterilely prepped and draped in the normal   fashion.  A timeout procedure was done.  The skin and subcutaneous tissues in   the neck were anesthetized with 10 mL of 1% lidocaine solution.  An incision   was made along the skin line.  The incision was extended through subcutaneous   tissue using the electrocautery.  The platysma muscle was also divided.  The   sternocleidomastoid was dissected on its medial aspect.  The internal jugular   vein was dissected medially.  The facial vein were ligated with 3-0 silk ties,   clipped with Hemoclips and divided.     Next, the common carotid and internal carotid arteries were identified and   carefully dissected.  The carotid bifurcation as well as proximal to mid   internal carotid artery were not  dissected to avoid the risk of   atheroembolization.  Care was also taken during the dissection process to   avoid injury to the nerves.  A Carotid sinus nerve block was performed with 1 mL   of 1% lidocaine solution.  A robot retractor was used.     Next, the patient was given heparin 5000 units intravenously.  At the same   time, his mean arterial pressure was maintained in the  range.  After   the heparin was allowed to circulate systemically for 3 minutes, vascular   control of the normal distal internal carotid artery was obtained with   vascular clamps.  The external carotid artery was circumferentially dissected   free at its origin and a vessel loop was doubly placed around the external   carotid artery and used for vascular control.  The proximal common carotid   artery was also clamped with vascular clamps.  An arteriotomy was made on the   anterolateral aspect of the common carotid artery, extended into the internal   carotid artery.  Upon entering the arterial lumen, there was critical (90%   plus) stenosis seen at the bifurcation and proximal internal carotid artery.    At this point, the vascular clamp on the normal distal internal carotid artery   was temporarily released and excellent backbleeding was seen; therefore, no   shunt was used for the remaining of the case.     Thromboendarterectomy of the common carotid and internal carotid artery was   performed.  Thromboendarterectomy of the external carotid artery was also   performed.  The intima on the distal internal carotid artery and proximal   common carotid artery were tacked with interrupted 7-0 Prolene sutures.  The   arterial lumen was thoroughly irrigated with heparinized saline solution and   was found to be free of any debris.     A sterile bovine pericardial patch was brought into the operative field and a   patch angioplasty was performed from the common carotid artery, extended into   the internal carotid artery using running 6-0  Prolene suture.  Prior to   completing the patch angioplasty, backbleeding and flushing were obtained.  The   patch angioplasty was completed.  Flow was restored first into the external   carotid artery and then into the internal carotid artery.  A sterile Doppler probe   was brought into the operative field.  Excellent Doppler flow signals were   obtained over the common carotid artery, carotid endarterectomy site, internal   and external carotid arteries.     Through a separate stab incision inferiorly, a 7 mm Kem-Guerrero drain was   brought into the operative field, cut to appropriate length and secured to the   skin with 3-0 nylon suture.  The platysma muscle was reapproximated over the   drain with running 3-0 Vicryl suture.  The skin edges were reapproximated with   4-0 Monocryl suture in a subcuticular manner.  The wounds were cleaned and   sterile dressing was applied.     ESTIMATED BLOOD LOSS:  25 mL     INTRAVENOUS FLUIDS:  One liter crystalloid.     COUNTS:  Sponge and instrument count was correct x2.     The patient was then awakened, extubated, taken to recovery area in stable   Condition and intact baseline neurological examination.        ______________________________  MD HARRISON Thompson/JAYRO    DD:  09/06/2023 10:54  DT:  09/06/2023 12:05    Job#:  [id number]

## 2023-09-06 NOTE — OR SURGEON
Immediate Post OP Note    PreOp Diagnosis: Severe right carotid artery stenosis.      PostOp Diagnosis: Critical right carotid artery stenosis.      Procedure(s):  RIGHT CAROTID ENDARTERECTOMY with patch angioplasty - Wound Class: Clean    Surgeon(s):  Nicol Sanchez M.D.    Anesthesiologist/Type of Anesthesia:  Anesthesiologist: Rogelio Caraballo M.D./General    Surgical Staff:  Circulator: Evelina Alberts R.N.  Scrub Person: Natan Figueroa Assist: MADAY Saldivar    Specimens removed if any:  * No specimens in log *    Estimated Blood Loss: 25 mL.      IV fluids: 1 L.    Findings: Critical stenosis.    Complications: None.    Dictated, #[id number].        9/6/2023 2:09 PM Nicol Sanchez M.D.

## 2023-09-06 NOTE — ANESTHESIA TIME REPORT
Anesthesia Start and Stop Event Times     Date Time Event    9/6/2023 07:52 AM Ready for Procedure    9/6/2023 09:00 AM Anesthesia Start    9/6/2023 10:39 AM Anesthesia Stop        Responsible Staff  09/06/23    Name Role Begin End    Rogelio Caraballo M.D. Anesthesiologist 09/06/23 09:00 AM 09/06/23 10:39 AM        Overtime Reason:  no overtime (within assigned shift)    Comments:

## 2023-09-06 NOTE — ANESTHESIA PROCEDURE NOTES
Airway    Date/Time: 9/6/2023 9:06 AM    Performed by: Rogelio Caraballo M.D.  Authorized by: Rogelio Caraballo M.D.    Location:  OR  Urgency:  Elective  Difficult Airway: No    Indications for Airway Management:  Anesthesia      Spontaneous Ventilation: absent    Sedation Level:  Deep  Preoxygenated: Yes    Patient Position:  Sniffing  MILS Maintained Throughout: No    Mask Difficulty Assessment:  2 - vent by mask + OA or adjuvant +/- NMBA  Final Airway Type:  Endotracheal airway  Final Endotracheal Airway:  ETT  Cuffed: Yes    Technique Used for Successful ETT Placement:  Direct laryngoscopy  Devices/Methods Used in Placement:  Anterior pressure/BURP    Insertion Site:  Oral  Blade Type:  Ángela  Laryngoscope Blade/Videolaryngoscope Blade Size:  4  ETT Size (mm):  7.5  Measured from:  Lips  ETT to Lips (cm):  22  Placement Verified by: auscultation and capnometry    Cormack-Lehane Classification:  Grade IIa - partial view of glottis  Number of Attempts at Approach:  1  Number of Other Approaches Attempted:  0

## 2023-09-06 NOTE — PROGRESS NOTES
Report received from PACU RN. Assumed care of pt. A/O x4. VSS. Responds appropriately. Denies pain, SOB. Explained importance of calling before getting OOB. Call light and belongings within reach.

## 2023-09-06 NOTE — OR NURSING
1037: Pt arrived from OR, handoff received from anesthesiologist and RN. Patient drowsy, moving all extremities. Surgical site to right side of neck with derma bond, C/D/I, MAJO drain to site with minimal bloody drainage. Site soft, no bruising or swelling noted.  Vitals stable. Blood pressure cuff correlating with right radial art line. SBP <150. Blood sugar result of 119.     1100: Patient more awake, oriented x4, denies presence pf numbness or tinging. States minimal soreness to surgical site, denies nausea. Hydralazine administered er MAR for sbp >150. Heart rhythm appears to be afib on monitor, EKG ordered per anesthesia, rate consistent in the 60s.     1105: EKG completed at bedside, rhythm sinus.     1115: Blood pressure improved post hydralazine administration. Doctor Laura at bedside, requested for aspirin 81 mg, oral dose to be given in pacu. Order released and pharmacist aware. Per doctor Sanchez patient has hx of afib, on eliquis. Okay to admit to [facility].     1130: Patient's son updated on status.     1200: Patient sleeping intermittently, Surgical site remains soft, dressing C/D/I.     1300: Patient tolerating oral intake of liquids. Denies needs.Awaiting bed assignment. One belongings bag retrieved from locker.     1340: Report given to T4 Veronica CLEMENT.     1345: Art line removed, site soft. No bleeding.

## 2023-09-06 NOTE — ANESTHESIA PROCEDURE NOTES
Arterial Line    Performed by: Rogelio Caraballo M.D.  Authorized by: Rogelio Caraballo M.D.    Start Time:  9/6/2023 9:11 AM  End Time:  9/6/2023 9:14 AM  Localization: surface landmarks    Patient Location:  OR  Indication: continuous blood pressure monitoring        Catheter Size:  20 G  Seldinger Technique?: Yes    Laterality:  Right  Site:  Radial artery  Line Secured:  Antimicrobial disc, tape and transparent dressing  Events: patient tolerated procedure well with no complications

## 2023-09-06 NOTE — CARE PLAN
Problem: Pain - Standard  Goal: Alleviation of pain or a reduction in pain to the patient’s comfort goal  Outcome: Progressing   Pt. Will continue to be in a tolerable level of pain by end of shift.  Problem: Knowledge Deficit - Standard  Goal: Patient and family/care givers will demonstrate understanding of plan of care, disease process/condition, diagnostic tests and medications  Outcome: Progressing  Updated pt. And family on POC, pt. Currently on bedrest, will continue to monitor VSS and update family as needed.   The patient is Stable - Low risk of patient condition declining or worsening    Shift Goals  Clinical Goals: Bedrest, VSS  Patient Goals: Comfort  Family Goals: NA    Progress made toward(s) clinical / shift goals:  Vss    Patient is not progressing towards the following goals:

## 2023-09-07 ENCOUNTER — PHARMACY VISIT (OUTPATIENT)
Dept: PHARMACY | Facility: MEDICAL CENTER | Age: 88
End: 2023-09-07
Payer: COMMERCIAL

## 2023-09-07 VITALS
RESPIRATION RATE: 18 BRPM | HEIGHT: 70 IN | BODY MASS INDEX: 23.13 KG/M2 | DIASTOLIC BLOOD PRESSURE: 51 MMHG | WEIGHT: 161.6 LBS | HEART RATE: 62 BPM | SYSTOLIC BLOOD PRESSURE: 123 MMHG | TEMPERATURE: 98.5 F | OXYGEN SATURATION: 91 %

## 2023-09-07 PROCEDURE — A9270 NON-COVERED ITEM OR SERVICE: HCPCS | Performed by: SURGERY

## 2023-09-07 PROCEDURE — RXMED WILLOW AMBULATORY MEDICATION CHARGE: Performed by: SURGERY

## 2023-09-07 PROCEDURE — 700111 HCHG RX REV CODE 636 W/ 250 OVERRIDE (IP): Performed by: SURGERY

## 2023-09-07 PROCEDURE — 700105 HCHG RX REV CODE 258: Performed by: SURGERY

## 2023-09-07 PROCEDURE — 700102 HCHG RX REV CODE 250 W/ 637 OVERRIDE(OP): Performed by: SURGERY

## 2023-09-07 RX ORDER — ASPIRIN 81 MG/1
81 TABLET ORAL DAILY
Qty: 100 TABLET | Refills: 0 | Status: SHIPPED | OUTPATIENT
Start: 2023-09-07

## 2023-09-07 RX ADMIN — CEFAZOLIN 2 G: 2 INJECTION, POWDER, FOR SOLUTION INTRAMUSCULAR; INTRAVENOUS at 08:27

## 2023-09-07 RX ADMIN — Medication 1 APPLICATOR: at 06:23

## 2023-09-07 RX ADMIN — Medication 400 MG: at 06:15

## 2023-09-07 RX ADMIN — BRIMONIDINE TARTRATE 1 DROP: 2 SOLUTION OPHTHALMIC at 06:16

## 2023-09-07 RX ADMIN — DOCUSATE SODIUM 100 MG: 100 CAPSULE, LIQUID FILLED ORAL at 06:15

## 2023-09-07 RX ADMIN — HYDROCHLOROTHIAZIDE 12.5 MG: 12.5 TABLET ORAL at 06:15

## 2023-09-07 RX ADMIN — AMLODIPINE BESYLATE 5 MG: 5 TABLET ORAL at 06:15

## 2023-09-07 RX ADMIN — ASPIRIN 81 MG 81 MG: 81 TABLET ORAL at 06:15

## 2023-09-07 RX ADMIN — CEFAZOLIN 2 G: 2 INJECTION, POWDER, FOR SOLUTION INTRAMUSCULAR; INTRAVENOUS at 01:06

## 2023-09-07 ASSESSMENT — PAIN DESCRIPTION - PAIN TYPE: TYPE: ACUTE PAIN

## 2023-09-07 NOTE — PROGRESS NOTES
Report received at bedside from previous shift RN   Assumed care. Pt in bed. A/O x4. VSS.   Responds appropriately.   Denies pain, denies SOB/denies chest pain. Provided non pharmacological interventions for comfort.  Denies N/V tolerating cardiac diet appropriately  Adequate oxygenation noted on RA  +Void, +flatus, last BM PTA per report  MAJO drain to right neck  Patient ambulates SBA w/ FWW   SCDs on  Assessment complete.   Discussed POC, pt verbalizes understanding.   Explained importance of calling before getting OOB.   Call light and belongings within reach. Patient moderate fall risk per felix rodas.   Bed in the lowest position. Treaded socks in place. Hourly rounding in progress.

## 2023-09-07 NOTE — PROGRESS NOTES
Bedside report received, assessment completed    A&O x  4, pt calls appropriately  Mobility: Up with x1 assist  Fall Risk Assessment: Moderate, bed alarm refused, door notifications on  Pain Assessment / Reassessment completed, medication provided per MAR  Diet: Cardiac  LDA:   IV Access: 20 L FA, CDI/ flushed/ SL  MAJO: R neck    GI/: + void, - flatus,  PTA BM  DVT Prophylaxis: Aspirin, SCD's on  Skin: per flowsheets     Reviewed plan of care with patient, bed in lowest position and locked, pt resting comfortably now, call light within reach, all needs met at this time. Interventions will be executed per plan of care

## 2023-09-07 NOTE — PROGRESS NOTES
4 Eyes Skin Assessment Completed by URBANO Ramirez and URBANO Yi.    Head WDL  Ears WDL  Nose WDL  Mouth WDL  Neck R neck incision with a MAJO  Breast/Chest WDL  Shoulder Blades Redness and Blanching  Spine WDL  (R) Arm/Elbow/Hand Bruising and Discoloration  (L) Arm/Elbow/Hand Bruising and Discoloration  Abdomen WDL  Groin WDL  Scrotum/Coccyx/Buttocks WDL  (R) Leg Bruising  (L) Leg Bruising  (R) Heel/Foot/Toe Missing toe 1  (L) Heel/Foot/Toe WDL          Devices In Places Blood Pressure Cuff, Pulse Ox, SCD's, and Nasal Cannula      Interventions In Place Gray Ear Foams, Pillows, and Heels Loaded W/Pillows    Possible Skin Injury No    Pictures Uploaded Into Epic N/A  Wound Consult Placed N/A  RN Wound Prevention Protocol Ordered No

## 2023-09-07 NOTE — CARE PLAN
The patient is Stable - Low risk of patient condition declining or worsening    Shift Goals  Clinical Goals: monitor VS/drain and neuro checks  Patient Goals: sleep  Family Goals: NA    Progress made toward(s) clinical / shift goals:  intermittently monitoring vitals. Neuro checks every 4 hours in place. Pt sleeping intermittently throughout shift.   Problem: Knowledge Deficit - Stroke Education  Goal: Patient's knowledge of stroke and risk factors will improve  Outcome: Progressing     Problem: Neuro Status  Goal: Neuro status will remain stable or improve  Outcome: Progressing

## 2023-09-07 NOTE — DISCHARGE SUMMARY
DATE OF ADMISSION:  09/06/2023   DATE OF DISCHARGE:  09/07/2023     ADMITTING DIAGNOSES:  1.  Critical right carotid artery stenosis.  2.  Moderate left carotid artery stenosis.  3.  Coronary artery disease.  4.  Hypertension.  5.  Chronic kidney disease.  6.  History of congestive heart failure.     DISCHARGE DIAGNOSES:  1.  Critical right carotid artery stenosis.  2.  Moderate left carotid artery stenosis.  3.  Coronary artery disease.  4.  Hypertension.  5.  Chronic kidney disease.  6.  History of congestive heart failure.     PROCEDURE:  Right carotid endarterectomy with bovine patch angioplasty,   performed on 09/06/2023.     HOSPITAL COURSE:  The patient is a pleasant 88-year-old male with multiple   medical problems who underwent right carotid endarterectomy with bovine patch   angioplasty on 09/06/2023 for critical stenosis.  He tolerated the procedure   well with minimal blood loss and was admitted to the floor postoperatively.    He continued to do well.  By postoperative day #1, he was able to ambulate   independently and tolerating diet.  His incision remained clean, dry and   intact without evidence of infection, no hematoma formation.  He had no   headache.  His neurological exam remained nonfocal.  He would like to go home   and therefore, will be discharged home.     DISCHARGE INSTRUCTIONS:  1.  Medications:  Resume home medication except hold Eliquis until this   Saturday.     The patient did not want to be sent home on pain medication since he had no   pain and therefore, was advised to take Tylenol as needed.     The patient was also advised to take aspirin 81 mg once a day while he is off   Eliquis.     ACTIVITY:  As tolerated except no lifting more than 10 pounds for 2 weeks.    The patient was instructed that he may remove dressing after 2 days and   shower, but no bath or hot tub for 2 weeks.     Followup appointment with Dr. Sanchez in 2-3 weeks.  The patient was instructed   to call my office  for appointment and for any problem.     CONDITION AT THE TIME OF DISCHARGE:  Stable.        ______________________________  MD HARRISON Thompson/ROGERIO/NINFA    DD:  09/07/2023 07:51  DT:  09/07/2023 08:32    Job#:  [id number]

## 2023-09-07 NOTE — PROGRESS NOTES
VASCULAR SURGERY PROGRESS NOTE       Awake.  Complains of lower back pain from lying in bed, wants a heating pad.  States that he was unable to sleep last night since he normally takes a sleeping pill at home and nurse did not give him a sleeping pill last night.  Denies headache.  Wants to go home.  AF, VSS.    General: Pleasant male in none apparent distress.  HEENT: Incision clean, dry, intact without erythema, drainage, fluctuation, or hematoma formation.  MAJO with scant amount of serosanguineous output.    Lungs: Clear to auscultation bilaterally.  Cardiovascular: Regular.  Neurological: Nonfocal.    Assessment: Status post right carotid endarterectomy.    Plan:  Doing well.  I removed MAJO.  Heat pad for lower back.  Ambulate with assistance.  Home today if continues to do well.    Discussed with patient.  All questions were answered.    Discharge summary dictated, #[id number]

## 2023-09-13 DIAGNOSIS — E78.5 DYSLIPIDEMIA: Chronic | ICD-10-CM

## 2023-09-13 RX ORDER — ROSUVASTATIN CALCIUM 20 MG/1
TABLET, COATED ORAL
Qty: 90 TABLET | Refills: 0 | Status: SHIPPED | OUTPATIENT
Start: 2023-09-13 | End: 2023-12-13

## 2023-09-13 NOTE — TELEPHONE ENCOUNTER
Is the patient due for a refill? Yes- courtesy refill    Was the patient seen the past year? No    Date of last office visit: 9/7/2022    Does the patient have an upcoming appointment?  No   If yes, When?     Provider to refill:CW    Does the patients insurance require a 100 day supply?  No

## 2023-09-21 ENCOUNTER — TELEMEDICINE (OUTPATIENT)
Dept: VASCULAR SURGERY | Facility: MEDICAL CENTER | Age: 88
End: 2023-09-21
Payer: MEDICARE

## 2023-09-21 ENCOUNTER — TELEPHONE (OUTPATIENT)
Dept: CARDIOLOGY | Facility: MEDICAL CENTER | Age: 88
End: 2023-09-21

## 2023-09-21 DIAGNOSIS — I10 ESSENTIAL HYPERTENSION, BENIGN: ICD-10-CM

## 2023-09-21 DIAGNOSIS — I25.5 ISCHEMIC CARDIOMYOPATHY: Chronic | ICD-10-CM

## 2023-09-21 DIAGNOSIS — Z98.890 S/P CAROTID ENDARTERECTOMY: ICD-10-CM

## 2023-09-21 PROCEDURE — 99024 POSTOP FOLLOW-UP VISIT: CPT | Mod: 95 | Performed by: SURGERY

## 2023-09-21 NOTE — PROGRESS NOTES
VASCULAR SURGERY                    Postop Note  _________________________________    9/21/2023    This is a telehealth video visit at patient's request.  Patient is status post right carotid endarterectomy on September 6, 2023.  He has no complaints.  He denies headache or any neurological symptom except for chronic hard of hearing.  He has been back on all of his home medications.    On video conference, the incision appears to be healing well without erythema, drainage, fluctuation.    Assessment: Postop.    Plan: Doing well.  I ordered follow-up carotid duplex to be done on November 21, 2023 at patient's request and asked patient to see me on the same day after the study is done as patient lives in [place].  Patient and his family members know to call me for any problem at any time.  All questions were answered.      Nicol Sanchez MD  [facility]  [phone]  [address], [facility] [address]

## 2023-09-22 RX ORDER — LOSARTAN POTASSIUM 50 MG/1
50 TABLET ORAL DAILY
Qty: 90 TABLET | Refills: 0 | Status: SHIPPED | OUTPATIENT
Start: 2023-09-22 | End: 2023-12-27

## 2023-10-10 ENCOUNTER — TELEMEDICINE (OUTPATIENT)
Dept: CARDIOLOGY | Facility: PHYSICIAN GROUP | Age: 88
End: 2023-10-10
Payer: MEDICARE

## 2023-10-10 ENCOUNTER — TELEPHONE (OUTPATIENT)
Dept: CARDIOLOGY | Facility: MEDICAL CENTER | Age: 88
End: 2023-10-10

## 2023-10-10 VITALS
WEIGHT: 170 LBS | HEIGHT: 70 IN | BODY MASS INDEX: 24.34 KG/M2 | OXYGEN SATURATION: 94 % | DIASTOLIC BLOOD PRESSURE: 66 MMHG | HEART RATE: 55 BPM | SYSTOLIC BLOOD PRESSURE: 128 MMHG

## 2023-10-10 DIAGNOSIS — Z95.1 S/P CABG X 1: ICD-10-CM

## 2023-10-10 DIAGNOSIS — I50.22 CHF (CONGESTIVE HEART FAILURE), NYHA CLASS II, CHRONIC, SYSTOLIC (HCC): Chronic | ICD-10-CM

## 2023-10-10 DIAGNOSIS — I25.10 CORONARY ARTERY DISEASE DUE TO CALCIFIED CORONARY LESION: ICD-10-CM

## 2023-10-10 DIAGNOSIS — Z79.01 CHRONIC ANTICOAGULATION: ICD-10-CM

## 2023-10-10 DIAGNOSIS — G45.9 TIA (TRANSIENT ISCHEMIC ATTACK): Chronic | ICD-10-CM

## 2023-10-10 DIAGNOSIS — I10 PRIMARY HYPERTENSION: ICD-10-CM

## 2023-10-10 DIAGNOSIS — I65.23 BILATERAL CAROTID ARTERY STENOSIS: Chronic | ICD-10-CM

## 2023-10-10 DIAGNOSIS — I25.84 CORONARY ARTERY DISEASE DUE TO CALCIFIED CORONARY LESION: ICD-10-CM

## 2023-10-10 DIAGNOSIS — Q21.0 VENTRICULAR SEPTAL DEFECT: Chronic | ICD-10-CM

## 2023-10-10 DIAGNOSIS — I25.2 PREVIOUS INFERIOR MYOCARDIAL INFARCTION OLDER THAN 8 WEEKS: Chronic | ICD-10-CM

## 2023-10-10 DIAGNOSIS — I25.5 ISCHEMIC CARDIOMYOPATHY: Chronic | ICD-10-CM

## 2023-10-10 DIAGNOSIS — E78.5 DYSLIPIDEMIA: Chronic | ICD-10-CM

## 2023-10-10 DIAGNOSIS — Z79.01 CHRONIC ANTICOAGULATION: Chronic | ICD-10-CM

## 2023-10-10 PROCEDURE — 99214 OFFICE O/P EST MOD 30 MIN: CPT | Mod: 95 | Performed by: INTERNAL MEDICINE

## 2023-10-10 RX ORDER — FUROSEMIDE 20 MG/1
TABLET ORAL
COMMUNITY
Start: 2023-10-02

## 2023-10-10 RX ORDER — DABIGATRAN ETEXILATE 150 MG/1
150 CAPSULE ORAL 2 TIMES DAILY
Qty: 60 CAPSULE | Refills: 3 | Status: SHIPPED | OUTPATIENT
Start: 2023-10-10

## 2023-10-10 RX ORDER — METHOCARBAMOL 750 MG/1
TABLET, FILM COATED ORAL
COMMUNITY
Start: 2023-07-15

## 2023-10-10 NOTE — PROGRESS NOTES
Chief Complaint   Patient presents with    Follow-Up     FV Dx: Bilateral carotid artery stenosis moderate 2018    Coronary Artery Disease     FV Dx: Coronary artery disease due to calcified coronary lesion       Subjective     Himanshu Salamanca is a 88 y.o. male who presents today for follow-up of his history of CABG with VSD TIAs    He had carotid surgery and has close [facility]    [facility]    Past Medical History:   Diagnosis Date    Acute nasopharyngitis 08/30/2023    cough, runny nose 8/9, no sxs since 8/23, not tested for covid.    Arthritis 08/30/2023    general body    Bilateral carotid artery stenosis moderate 2018     Blood clotting disorder (HCC) 10 years ago    had blood clots in both legs    Breath shortness 08/30/2023    with exertion    Cancer (HCC) approximately 5 years ago    on face, treated, resolved    Cataract 2006/2007    removed    CHF (congestive heart failure), NYHA class II, chronic, systolic ([facility]) EF 45% 2018     Chronic anticoagulation     Dental disorder 08/30/2023    upper and lower partial    Diabetes 08/30/2023    resolved after heart surgery    Glaucoma 08/30/2023    left eye, medicated drops daily    Heart burn occasional    High cholesterol 08/30/2023    medicated    Hypertension 08/30/2023    medicated    Ischemic cardiomyopathy - EF 45% 2018     Pain within last 10 years    Shoulders, knees, feet, back    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 Escalona     Stroke ([facility]) about 3 years ago    mini stroke times 2    TIA (transient ischemic attack) 2019 and prior based on clinical history     Urinary incontinence     take a pee pill that helps    VSD (ventricular septal defect) 03/27/2013    [place] Jem patch 3/29/2013, Dr. Escalona     VSD (ventricular septal defect) s/p repair 03/27/2013    [place]  Jem patch 3/29/2013, Dr. Escalona      Past Surgical History:   Procedure Laterality Date    NC THROMBOENDARTECTMY NECK,NECK INCIS Right 2023    Procedure: RIGHT CAROTID ENDARTERECTOMY;  Surgeon: Nicol Sanchez M.D.;  Location: SURGERY [facility];  Service: General    OTHER  2023    tonsillectomy as child    OTHER ABDOMINAL SURGERY  2023    appendectomy    LUMBAR LAMINECTOMY DISKECTOMY Right 2018    Procedure: LUMBAR LAMINECTOMY DISKECTOMY- L5-S1;  Surgeon: Celestino Ball M.D.;  Location: SURGERY [facility];  Service: Neurosurgery    FORAMINOTOMY  2018    Procedure: FORAMINOTOMY;  Surgeon: Celestino Ball M.D.;  Location: SURGERY [facility];  Service: Neurosurgery    TOE AMPUTATION  2013    Performed by Khang Orlando M.D. at [facility]    VENTRAL SEPTAL DEFECT REPAIR  2013    Performed by Miesha Escalona M.D. at SURGERY [facility]    MULTIPLE CORONARY ARTERY BYPASS ENDO VEIN HARVEST  2013    Performed by Miesha Escalona M.D. at SURGERY [facility]    OTHER NEUROLOGICAL SURG  within last 15 years    [place] [facility] - herniated disc     Family History   Problem Relation Age of Onset    Heart Disease Father 77         during heart surgery     Social History     Socioeconomic History    Marital status:      Spouse name: Not on file    Number of children: Not on file    Years of education: Not on file    Highest education level: Not on file   Occupational History    Not on file   Tobacco Use    Smoking status: Former     Current packs/day: 0.00     Types: Cigarettes     Quit date: 1980     Years since quittin.7    Smokeless tobacco: Former     Types: Chew     Quit date:    Vaping Use    Vaping Use: Never used   Substance and Sexual Activity    Alcohol use: Yes     Alcohol/week: 0.6 oz     Types: 1 Cans of beer per week     Comment: beer once in a while    Drug use: Yes     Comment: [employer] brownie nightly     "Sexual activity: Not on file   Other Topics Concern    Not on file   Social History Narrative    Not on file     Social Determinants of Health     Financial Resource Strain: Not on file   Food Insecurity: Not on file   Transportation Needs: Not on file   Physical Activity: Not on file   Stress: Not on file   Social Connections: Not on file   Intimate Partner Violence: Not on file   Housing Stability: Not on file     Allergies   Allergen Reactions    Morphine Sulfate Vomiting     HDM=1414     Outpatient Encounter Medications as of 10/10/2023   Medication Sig Dispense Refill    furosemide (LASIX) 20 MG Tab       methocarbamol (ROBAXIN) 750 MG Tab       apixaban (ELIQUIS) 5mg Tab Take 1 Tablet by mouth 2 times a day. 180 Tablet 0    losartan (COZAAR) 50 MG Tab TAKE 1 TABLET DAILY. 90 Tablet 0    rosuvastatin (CRESTOR) 20 MG Tab TAKE ONE TABLET EVERY EVENING. 90 Tablet 0    aspirin 81 MG EC tablet Take 1 Tablet by mouth every day. 100 Tablet 0    hydrochlorothiazide (MICROZIDE) 12.5 MG capsule Take 1 Capsule by mouth every day. 90 Capsule 2    metoprolol SR (TOPROL XL) 25 MG TABLET SR 24 HR Take 1 Tablet by mouth every day. 90 Tablet 3    amLODIPine (NORVASC) 5 MG Tab Take 1 Tablet by mouth every day. 90 Tablet 3    LUMIGAN 0.01 % Solution Administer 1 Drop into the left eye at bedtime.      brimonidine (ALPHAGAN) 0.2 % Solution Administer 1 Drop into the left eye every day.      magnesium oxide (MAG-OX) 400 MG Tab tablet Take 400 mg by mouth every day.      doxazosin (CARDURA) 4 MG Tab Take 1 Tab by mouth every day. 90 Tab 3    zolpidem (AMBIEN) 10 MG Tab Take 5 mg by mouth at bedtime as needed.       No facility-administered encounter medications on file as of 10/10/2023.     ROS           Objective     /66 (BP Location: Left arm, Patient Position: Sitting, BP Cuff Size: Adult)   Pulse (!) 55   Ht 1.778 m (5' 10\")   Wt 77.1 kg (170 lb)   SpO2 94%   BMI 24.39 kg/m²     Physical Exam  Constitutional:       " General: He is not in acute distress.     Appearance: He is not diaphoretic.   Eyes:      General: No scleral icterus.  Neck:      Vascular: No JVD.   Cardiovascular:      Rate and Rhythm: Normal rate.      Heart sounds: Normal heart sounds. No murmur heard.     No friction rub. No gallop.   Pulmonary:      Effort: No respiratory distress.      Breath sounds: No wheezing or rales.   Abdominal:      General: Bowel sounds are normal.      Palpations: Abdomen is soft.   Musculoskeletal:      Right lower leg: No edema.      Left lower leg: No edema.   Skin:     Findings: No rash.   Neurological:      Mental Status: He is alert. Mental status is at baseline.   Psychiatric:         Mood and Affect: Mood normal.              We reviewed in person the most recent labs  Recent Results (from the past 5040 hour(s))   Lipid Profile    Collection Time: 06/05/23 12:00 AM   Result Value Ref Range    LDL 37    CBC WITH DIFFERENTIAL    Collection Time: 09/05/23  2:18 PM   Result Value Ref Range    WBC 6.5 4.8 - 10.8 K/uL    RBC 5.00 4.70 - 6.10 M/uL    Hemoglobin 14.6 14.0 - 18.0 g/dL    Hematocrit 46.2 42.0 - 52.0 %    MCV 92.4 81.4 - 97.8 fL    MCH 29.2 27.0 - 33.0 pg    MCHC 31.6 (L) 32.3 - 36.5 g/dL    RDW 52.8 (H) 35.9 - 50.0 fL    Platelet Count 142 (L) 164 - 446 K/uL    MPV 10.7 9.0 - 12.9 fL    Neutrophils-Polys 67.60 44.00 - 72.00 %    Lymphocytes 20.30 (L) 22.00 - 41.00 %    Monocytes 9.00 0.00 - 13.40 %    Eosinophils 1.20 0.00 - 6.90 %    Basophils 1.40 0.00 - 1.80 %    Immature Granulocytes 0.50 0.00 - 0.90 %    Nucleated RBC 0.00 0.00 - 0.20 /100 WBC    Neutrophils (Absolute) 4.37 1.82 - 7.42 K/uL    Lymphs (Absolute) 1.31 1.00 - 4.80 K/uL    Monos (Absolute) 0.58 0.00 - 0.85 K/uL    Eos (Absolute) 0.08 0.00 - 0.51 K/uL    Baso (Absolute) 0.09 0.00 - 0.12 K/uL    Immature Granulocytes (abs) 0.03 0.00 - 0.11 K/uL    NRBC (Absolute) 0.00 K/uL   Basic Metabolic Panel    Collection Time: 09/05/23  2:18 PM   Result Value  Ref Range    Sodium 141 135 - 145 mmol/L    Potassium 4.1 3.6 - 5.5 mmol/L    Chloride 104 96 - 112 mmol/L    Co2 28 20 - 33 mmol/L    Glucose 134 (H) 65 - 99 mg/dL    Bun 20 8 - 22 mg/dL    Creatinine 0.97 0.50 - 1.40 mg/dL    Calcium 9.5 8.5 - 10.5 mg/dL    Anion Gap 9.0 7.0 - 16.0   PreAdmit COD    Collection Time: 23  2:18 PM   Result Value Ref Range    ABO Grouping Only O     Rh Grouping Only POS     Antibody Screen Scrn NEG    ESTIMATED GFR    Collection Time: 23  2:18 PM   Result Value Ref Range    GFR (CKD-EPI) 75 >60 mL/min/1.73 m 2   EKG    Collection Time: 23  2:29 PM   Result Value Ref Range    Report       [facility]    Test Date:  2023  Pt Name:    ENMA MARTELL              Department: [facility]  MRN:        [id number]                      Room:  Gender:     Male                         Technician: MARY  :        [id number]                   Requested By:NORMA THORNE  Order #:    [id number]                    Reading MD: George Oneill MD    Measurements  Intervals                                Axis  Rate:       49                           P:          0  RI:         177                          QRS:        54  QRSD:       115                          T:          -2  QT:         478  QTc:        432    Interpretive Statements  Sinus bradycardia  Nonspecific intraventricular conduction delay  Inferior infarct, age indeterminate  Nonspecific ST-T wave changes  Electronically Signed On 2023 08:58:18 PDT by George Oneill MD     POCT glucose device results    Collection Time: 23 10:40 AM   Result Value Ref Range    POC Glucose, Blood 119 (H) 65 - 99 mg/dL           Assessment & Plan     1. CHF (congestive heart failure), NYHA class II, chronic, systolic (HCC) EF 45%         2. Bilateral carotid artery stenosis moderate 2018        3. Chronic anticoagulation        4. Dyslipidemia        5. Ischemic cardiomyopathy - EF 45%         6. Previous inferior  myocardial infarction 2013 - complicated by VSD s/p CABG and VSD repair        7. S/P CABG x 1        8. TIA (transient ischemic attack) 2019 and prior based on clinical history        9. VSD (ventricular septal defect) s/p repair        10. Coronary artery disease due to calcified coronary lesion        11. Primary hypertension            Medical Decision Making: Today's Assessment/Status/Plan:      It was my pleasure to meet with Mr. Salamanca.    We addressed the management of hypertension at today's visit. Blood pressure is well controlled.  We specifically assessed the labs on hypertension treatment    We addressed the management of dyslipidemia and atherosclerosis at today's visit. He is on appropriate statin.    We addressed the management of atherosclerotic artery disease.  He is on proper antiplatelet, cholesterol management and beta-blockers as appropriate.  We addressed the potential side effects and laboratory follow-up for these medications.  Will switch to pradaxa but he has to see me in the future only at [place] office.    We addressed the management of chronic anticoagulation at today's visit. He understands the risks and benefits of chronic anticoagulation.  We reviewed and verified the results of annual testing for anemia and kidney function.        I will see Mr. Salamacna back in 1 year time and encouraged him to follow up with us over the phone or electronically using my MyChart as issues arise.    It is my pleasure to participate in the care of Mr. Salamanca.  Please do not hesitate to contact me with questions or concerns.    Aj Washington MD PhD FACC  Cardiologist [facility]    Please note that this dictation was created using voice recognition software. There may be errors I did not discover before finalizing the note.

## 2023-10-10 NOTE — PATIENT INSTRUCTIONS
See if Pradaxa is a better option call [facility] [phone]    OPTIONS FOR BLOOD THINNERS TO DRAMATICALLY REDUCE YOUR RISK OF STROKE WITH ATRIAL FIBRILLATION (in order of preference):    Eliquis (apixaban) 5 mg twice a day  Generic not available so may cost $20-$600 copay.    Eliquis is preferred because it is the least likely to cause GI Bleeding (20% less likely than dabigitran (Pradaxa)*, may also have 28% less risk of stroke (1.12% vs 1.43% in 100 person years) than dabigitran (Pradaxa)**  No great antidote   Preferred in patients with severe kidney disease, underweight or morbidly obese    Dabigatran (Pradaxa) 150 mg twice a day   Now generic so can cost as low as $25 per month at [facility]  antidote available  Has to be taken with full glass of water can cause acid indigestion    Warfarin plus blood testing typically monthly   Generic so very affordable  antidote available in all hospitals  NEED TO MONITOR VITAMIN K FOODS SUCH AS GREEN VEGETABLES  Interacts with multiple medicine requiring more frequent blood checks    Saveysa (Edoxaban) 60-90 mg daily   Generic not available so may cost $20-$600 copay.   Newest medication and not commonly prescribed   No great antidote    Xarelto (rivaroxaban) 20 mg once a day with food   Generic not available so may cost $20-$600 copay.   Preferred for those that once daily medicine without frequent lab draws and diet monitoring   No great antidote    For all blood thinners, unless you have a history of stroke when you have stopped them in the past, we will provide guidance to your surgeon on how to safely hold medications around the time of surgery.  If you have a hisotry of stroke when off blood thinners in the past please be sure to inform us if you have to stop blood thinners.    IF YOU HIT YOUR HEAD GET EMERGENCY CHECK IN THE ER.  IF YOU HAVE A MAJOR INJURY AND PAIN/DIZZINESS GET EMERGENCY CHECK    You should check lab work at least yearly for anemia and  metabolic panle on all blood thinners, sooner for symptoms of anemia such as blood in the stool or progressive shortness of breath  You are not supposed to have grapefruit with any of the medications.    You should avoid excessive alcohol on blood thinners  You should avoid other natural blood thinners such as fish oil or other supplements while on prescription blood thinners  You are not supposed to take NSAIDS with anticoagulants  In most cases antiplatelets like aspirin and clopidrogrel are not advised unless your cardiologist specifically says to take together    *Based on large observational study of 500,000 patients in [place]   Comparative Effectiveness and Safety Between Apixaban, Dabigatran, Edoxaban, and Rivaroxaban Among Patients With Atrial Fibrillation A Multinational Population-Based Cohort Study   Alexus Chávez, PhD*, Jessy Overton, MSc*, et al.  Annals of Internal Medicine Original Research November 2022  [email or web address]  ** Based on large observation study 37,000 patients in the   Effectiveness and Safety of Oral Anticoagulants Among Nonvalvular Atrial Fibrillation Patients The ARISTOPHANES Study  Chris Adrian et al. Stroke. 2018;49:4230-3652  [email or web address]

## 2023-10-11 ENCOUNTER — TELEPHONE (OUTPATIENT)
Dept: CARDIOLOGY | Facility: MEDICAL CENTER | Age: 88
End: 2023-10-11
Payer: MEDICARE

## 2023-10-11 NOTE — TELEPHONE ENCOUNTER
Received [employer] request via [employer]  for Pradaxa 150mg capsules-DOAC. (Quantity:60, Day Supply:30)     Insurance: [employer]  Member ID:[id number]  BIN: [id number]  PCN: [id number]  Group: [id number]     Ran Test claim via [place] & medication Rejects stating prior authorization is required.

## 2023-10-17 DIAGNOSIS — I10 ESSENTIAL HYPERTENSION, BENIGN: ICD-10-CM

## 2023-10-17 RX ORDER — METOPROLOL SUCCINATE 25 MG/1
25 TABLET, EXTENDED RELEASE ORAL DAILY
Qty: 90 TABLET | Refills: 3 | Status: SHIPPED | OUTPATIENT
Start: 2023-10-17

## 2023-10-17 RX ORDER — AMLODIPINE BESYLATE 5 MG/1
5 TABLET ORAL DAILY
Qty: 90 TABLET | Refills: 3 | Status: SHIPPED | OUTPATIENT
Start: 2023-10-17

## 2023-10-17 NOTE — TELEPHONE ENCOUNTER
Is the patient due for a refill? Yes    Was the patient seen the past year? Yes    Date of last office visit: 10/10/2023    Does the patient have an upcoming appointment?  No    Provider to refill: CW    Does the patients insurance require a 100 day supply?  No

## 2023-10-17 NOTE — TELEPHONE ENCOUNTER
Ran test claim for generic Pradaxa and received a paid claim for 60 capsules for 30 days- $39.80    Sent to outreach

## 2023-11-21 ENCOUNTER — APPOINTMENT (OUTPATIENT)
Dept: VASCULAR SURGERY | Facility: MEDICAL CENTER | Age: 88
End: 2023-11-21
Payer: MEDICARE

## 2023-11-21 ENCOUNTER — APPOINTMENT (OUTPATIENT)
Dept: RADIOLOGY | Facility: MEDICAL CENTER | Age: 88
End: 2023-11-21
Attending: SURGERY
Payer: MEDICARE

## 2023-12-12 DIAGNOSIS — E78.5 DYSLIPIDEMIA: Chronic | ICD-10-CM

## 2023-12-13 RX ORDER — ROSUVASTATIN CALCIUM 20 MG/1
TABLET, COATED ORAL
Qty: 90 TABLET | Refills: 3 | Status: SHIPPED | OUTPATIENT
Start: 2023-12-13

## 2023-12-13 NOTE — TELEPHONE ENCOUNTER
Is the patient due for a refill? Yes    Was the patient seen the past year? Yes    Date of last office visit: 10/10/23    Does the patient have an upcoming appointment?  No    Provider to refill:CW    Does the patients insurance require a 100 day supply?  No

## 2023-12-22 DIAGNOSIS — Z79.01 CHRONIC ANTICOAGULATION: ICD-10-CM

## 2023-12-22 RX ORDER — APIXABAN 5 MG/1
5 TABLET, FILM COATED ORAL 2 TIMES DAILY
Qty: 60 TABLET | Refills: 0 | Status: SHIPPED | OUTPATIENT
Start: 2023-12-22 | End: 2024-03-01 | Stop reason: SDUPTHER

## 2023-12-26 DIAGNOSIS — I10 ESSENTIAL HYPERTENSION, BENIGN: ICD-10-CM

## 2023-12-26 DIAGNOSIS — I25.5 ISCHEMIC CARDIOMYOPATHY: Chronic | ICD-10-CM

## 2023-12-27 DIAGNOSIS — I50.22 CHF (CONGESTIVE HEART FAILURE), NYHA CLASS II, CHRONIC, SYSTOLIC (HCC): ICD-10-CM

## 2023-12-27 DIAGNOSIS — I10 PRIMARY HYPERTENSION: ICD-10-CM

## 2023-12-27 RX ORDER — LOSARTAN POTASSIUM 50 MG/1
50 TABLET ORAL DAILY
Qty: 90 TABLET | Refills: 3 | Status: SHIPPED | OUTPATIENT
Start: 2023-12-27

## 2023-12-27 RX ORDER — HYDROCHLOROTHIAZIDE 12.5 MG/1
CAPSULE, GELATIN COATED ORAL
Qty: 90 CAPSULE | Refills: 3 | Status: SHIPPED | OUTPATIENT
Start: 2023-12-27

## 2024-03-01 DIAGNOSIS — Z79.01 CHRONIC ANTICOAGULATION: ICD-10-CM

## 2024-06-30 DIAGNOSIS — I10 ESSENTIAL HYPERTENSION, BENIGN: ICD-10-CM

## 2024-06-30 DIAGNOSIS — I25.5 ISCHEMIC CARDIOMYOPATHY: Chronic | ICD-10-CM

## 2024-06-30 DIAGNOSIS — E78.5 DYSLIPIDEMIA: Chronic | ICD-10-CM

## 2024-06-30 DIAGNOSIS — Z79.01 CHRONIC ANTICOAGULATION: ICD-10-CM

## 2024-07-01 RX ORDER — ROSUVASTATIN CALCIUM 20 MG/1
TABLET, COATED ORAL
Qty: 90 TABLET | Refills: 3 | OUTPATIENT
Start: 2024-07-01

## 2024-07-01 RX ORDER — LOSARTAN POTASSIUM 50 MG/1
50 TABLET ORAL DAILY
Qty: 90 TABLET | Refills: 3 | OUTPATIENT
Start: 2024-07-01

## 2024-07-01 RX ORDER — HYDROCHLOROTHIAZIDE 12.5 MG/1
CAPSULE, GELATIN COATED ORAL
Qty: 90 CAPSULE | Refills: 3 | OUTPATIENT
Start: 2024-07-01

## 2024-07-02 RX ORDER — METOPROLOL SUCCINATE 25 MG/1
25 TABLET, EXTENDED RELEASE ORAL DAILY
Qty: 90 TABLET | Refills: 0 | Status: SHIPPED | OUTPATIENT
Start: 2024-07-02

## 2024-07-02 RX ORDER — AMLODIPINE BESYLATE 5 MG/1
5 TABLET ORAL DAILY
Qty: 90 TABLET | Refills: 0 | Status: SHIPPED | OUTPATIENT
Start: 2024-07-02

## 2024-07-16 DIAGNOSIS — I10 PRIMARY HYPERTENSION: ICD-10-CM

## 2024-07-16 DIAGNOSIS — I65.22 CAROTID STENOSIS, ASYMPTOMATIC, LEFT: ICD-10-CM

## 2024-07-16 DIAGNOSIS — Z98.890 S/P CAROTID ENDARTERECTOMY: ICD-10-CM

## 2024-07-16 DIAGNOSIS — E78.5 DYSLIPIDEMIA: ICD-10-CM

## 2024-07-16 DIAGNOSIS — I65.21 CAROTID STENOSIS, ASYMPTOMATIC, RIGHT: ICD-10-CM

## 2024-07-30 ENCOUNTER — TELEPHONE (OUTPATIENT)
Dept: VASCULAR SURGERY | Facility: MEDICAL CENTER | Age: 89
End: 2024-07-30
Payer: MEDICARE

## 2024-08-29 ENCOUNTER — OFFICE VISIT (OUTPATIENT)
Dept: VASCULAR SURGERY | Facility: MEDICAL CENTER | Age: 89
End: 2024-08-29
Payer: MEDICARE

## 2024-08-29 VITALS
DIASTOLIC BLOOD PRESSURE: 74 MMHG | BODY MASS INDEX: 24.34 KG/M2 | TEMPERATURE: 97.9 F | SYSTOLIC BLOOD PRESSURE: 142 MMHG | HEIGHT: 70 IN | HEART RATE: 67 BPM | OXYGEN SATURATION: 94 % | WEIGHT: 170 LBS

## 2024-08-29 DIAGNOSIS — I65.22 CAROTID STENOSIS, ASYMPTOMATIC, LEFT: ICD-10-CM

## 2024-08-29 DIAGNOSIS — Z98.890 H/O CAROTID ENDARTERECTOMY: ICD-10-CM

## 2024-08-29 DIAGNOSIS — E78.5 DYSLIPIDEMIA: ICD-10-CM

## 2024-08-29 DIAGNOSIS — I10 PRIMARY HYPERTENSION: ICD-10-CM

## 2024-08-29 NOTE — PROGRESS NOTES
"  VASCULAR SURGERY SERVICE  OFFICE FOLLOW UP      Date: 8/29/2024    Referring Provider: Ze Tamayo MD    Consulting Physician: Nicol Sanchez MD - [facility]     -------------------------------------------------------------------------------------------------    Chief complaint:  \"Here for follow-up\".    HPI:  Mr. Salamanca comes to the clinic today with his family member for follow-up.  He underwent right carotid endarterectomy on September 6, 2023.  On follow-up today, he has no complaints.  He denies any neurological symptoms except for chronic hard of hearing.    Recent carotid duplex showed the right carotid endarterectomy site to be widely patent and 50 to 69% left internal carotid stenosis.    Past Medical History:   Diagnosis Date    Acute nasopharyngitis 08/30/2023    cough, runny nose 8/9, no sxs since 8/23, not tested for covid.    Arthritis 08/30/2023    general body    Bilateral carotid artery stenosis moderate 2018     Blood clotting disorder ([facility]) 10 years ago    had blood clots in both legs    Breath shortness 08/30/2023    with exertion    Cancer ([facility]) approximately 5 years ago    on face, treated, resolved    Cataract 2006/2007    removed    CHF (congestive heart failure), NYHA class II, chronic, systolic ([facility]) EF 45% 2018     Chronic anticoagulation     Dental disorder 08/30/2023    upper and lower partial    Diabetes 08/30/2023    resolved after heart surgery    Glaucoma 08/30/2023    left eye, medicated drops daily    Heart burn occasional    High cholesterol 08/30/2023    medicated    Hypertension 08/30/2023    medicated    Ischemic cardiomyopathy - EF 45% 2018     Pain within last 10 years    Shoulders, knees, feet, back    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 Escaloan     Stroke ([facility]) about 3 years ago    mini stroke times 2    TIA (transient ischemic attack) 2019 " and prior based on clinical history     Urinary incontinence     take a pee pill that helps    VSD (ventricular septal defect) 03/27/2013    [place] Jem patch 3/29/2013, Dr. Escalona     VSD (ventricular septal defect) s/p repair 03/27/2013    [place] Jem patch 3/29/2013, Dr. Escalona        Past Surgical History:   Procedure Laterality Date    WI THROMBOENDARTECTMY NECK,NECK INCIS Right 9/6/2023    Procedure: RIGHT CAROTID ENDARTERECTOMY;  Surgeon: Nicol Sanchez M.D.;  Location: SURGERY [facility];  Service: General    OTHER  08/30/2023    tonsillectomy as child    OTHER ABDOMINAL SURGERY  08/30/2023    appendectomy    LUMBAR LAMINECTOMY DISKECTOMY Right 02/16/2018    Procedure: LUMBAR LAMINECTOMY DISKECTOMY- L5-S1;  Surgeon: Celestino Ball M.D.;  Location: SURGERY [facility];  Service: Neurosurgery    FORAMINOTOMY  02/16/2018    Procedure: FORAMINOTOMY;  Surgeon: Celestino Ball M.D.;  Location: SURGERY [facility];  Service: Neurosurgery    TOE AMPUTATION  04/16/2013    Performed by Khang Orlando M.D. at [facility]    VENTRAL SEPTAL DEFECT REPAIR  03/29/2013    Performed by Miesha Escalona M.D. at SURGERY [facility]    MULTIPLE CORONARY ARTERY BYPASS ENDO VEIN HARVEST  03/29/2013    Performed by Miesha Escalona M.D. at SURGERY [facility]    OTHER NEUROLOGICAL SURG  within last 15 years    [facility] - herniated disc       Current Outpatient Medications   Medication Sig Dispense Refill    amLODIPine (NORVASC) 5 MG Tab Take 1 Tablet by mouth every day. 90 Tablet 0    metoprolol SR (TOPROL XL) 25 MG TABLET SR 24 HR Take 1 Tablet by mouth every day. 90 Tablet 0    apixaban (ELIQUIS) 5mg Tab Take 1 Tablet by mouth 2 times a day. 180 Tablet 0    losartan (COZAAR) 50 MG Tab TAKE 1 TABLET DAILY. 90 Tablet 3    hydrochlorothiazide (MICROZIDE) 12.5 MG capsule TAKE ONE CAPSULE ORALLY EACH DAY. 90 Capsule 3    rosuvastatin (CRESTOR) 20 MG Tab TAKE ONE TABLET EVERY EVENING. 90  Tablet 3    furosemide (LASIX) 20 MG Tab       methocarbamol (ROBAXIN) 750 MG Tab       dabigatran (PRADAXA) 150 MG Cap capsule Take 1 Capsule by mouth 2 times a day. Take with a full glass of water 60 Capsule 3    aspirin 81 MG EC tablet Take 1 Tablet by mouth every day. 100 Tablet 0    LUMIGAN 0.01 % Solution Administer 1 Drop into the left eye at bedtime.      brimonidine (ALPHAGAN) 0.2 % Solution Administer 1 Drop into the left eye every day.      magnesium oxide (MAG-OX) 400 MG Tab tablet Take 400 mg by mouth every day.      doxazosin (CARDURA) 4 MG Tab Take 1 Tab by mouth every day. 90 Tab 3    zolpidem (AMBIEN) 10 MG Tab Take 5 mg by mouth at bedtime as needed.       No current facility-administered medications for this visit.       Social History     Socioeconomic History    Marital status:      Spouse name: Not on file    Number of children: Not on file    Years of education: Not on file    Highest education level: Not on file   Occupational History    Not on file   Tobacco Use    Smoking status: Former     Current packs/day: 0.00     Types: Cigarettes     Quit date: 1980     Years since quittin.6    Smokeless tobacco: Former     Types: Chew     Quit date:    Vaping Use    Vaping status: Never Used   Substance and Sexual Activity    Alcohol use: Yes     Alcohol/week: 0.6 oz     Types: 1 Cans of beer per week     Comment: beer once in a while    Drug use: Yes     Comment: [employer] brownie nightly    Sexual activity: Not on file   Other Topics Concern    Not on file   Social History Narrative    Not on file     Social Determinants of Health     Financial Resource Strain: Not on file   Food Insecurity: Not on file   Transportation Needs: Not on file   Physical Activity: Not on file   Stress: Not on file   Social Connections: Not on file   Intimate Partner Violence: Not on file   Housing Stability: Not on file       Family History   Problem Relation Age of Onset    Heart Disease Father 77         " during heart surgery       Allergies:  Morphine sulfate    Review of Systems:    Constitutional: Negative for fever, chills, weight loss,   HENT:    Positive for hard of hearing  Eyes:    Negative for blurred vision, double vision, or loss of vision  Respiratory:  Negative for cough, hemoptysis, or wheezing    Cardiac:  Negative for chest pain or palpitations or orthopnea  Vascular:  Negative for claudication or rest pain   Gastrointestinal: Negative for nausea, vomiting, or abdominal pain     Negative for hematochezia or melena   Genitourinary: Negative for dysuria, frequency, or hematuria   Musculoskeletal: Negative for myalgias, back pain, or joint pain  Skin:   Negative for itching or rash  Neurological:  Negative for dizziness, headaches, or tremors     Negative for speech disturbance     Negative for extremity weakness or paresthesias  Endo/Heme:  Negative for easy bruising or bleeding  Psychiatric:  Negative for depression, suicidal ideas, or hallucinations    Physical Exam:  BP (!) 142/74 (BP Location: Left arm, Patient Position: Sitting, BP Cuff Size: Adult)   Pulse 67   Temp 36.6 °C (97.9 °F) (Temporal)   Ht 1.778 m (5' 10\")   Wt 77.1 kg (170 lb)   SpO2 94%     Constitutional: Alert, oriented, no acute distress  HEENT:  Normocephalic and atraumatic, EOMI  Neck:   Supple, no JVD.  Well-healed scars on right.  Faint bruits on left.  Cardiovascular: Regular rate and rhythm  Pulmonary:  Good air entry bilaterally  Abdominal:  Soft, non-tender, non-distended     Aortic impulse not widened  Musculoskeletal: No edema, no tenderness  Neurological:  CN II-XII grossly intact, no focal deficits except hard of hearing  Skin:   Skin is warm and dry. No rash noted.  Psychiatric:  Normal mood and affect.  Upper extremities: Palpable bilateral radial pulses.  Lower extremities: Feet are warm, well-perfused.  No pulsatile masses in femoral or popliteal areas.      Radiology:  Reviewed.    Assessment:  -History of " right carotid endarterectomy.  -Moderate left carotid artery stenosis, asymptomatic.  -Coronary artery disease.  -Hypertension.  -Dyslipidemia.    Plan:  I had a long discussion with patient and his family members.  Study results were reviewed with them.  Since the degree of stenosis on the left side is less than 70% and asymptomatic, no invasive intervention is indicated at this point.  I recommended that follow-up carotid duplex be performed every 6 months.  Patient and his family member preferred to follow-up with his primary care provider to have the carotid duplex done every 6 months since they live in [place].  I asked them to come back to see me once the degree of stenosis is 70% or more.  I also advised patient and his family member to call 911 and have patient taken to the emergency room if he developed any neurological symptom at any time.  They were instructed on the signs and symptoms to look out for.  They indicated understanding and agreed with plan.  All questions were answered.      Nicol Sanchez MD  [facility] Vascular Surgery   [facility] preferred or call my office [phone]  __________________________________________________________________  Patient:William Salamanca   MRN:[id number]   CSN:[id number]

## 2025-03-03 DIAGNOSIS — I25.84 CORONARY ARTERY DISEASE DUE TO CALCIFIED CORONARY LESION: ICD-10-CM

## 2025-03-03 DIAGNOSIS — I25.10 CORONARY ARTERY DISEASE DUE TO CALCIFIED CORONARY LESION: ICD-10-CM

## 2025-03-19 DIAGNOSIS — I25.10 CORONARY ARTERY DISEASE DUE TO CALCIFIED CORONARY LESION: ICD-10-CM

## 2025-03-19 DIAGNOSIS — I25.84 CORONARY ARTERY DISEASE DUE TO CALCIFIED CORONARY LESION: ICD-10-CM

## 2025-06-23 NOTE — TELEPHONE ENCOUNTER
Telephone note signed by MD printed and faxed to [phone], completed status.    Fax sent to scanning for reference via [employer], completed status received.   94

## 2025-08-18 DIAGNOSIS — I25.84 CORONARY ARTERY DISEASE DUE TO CALCIFIED CORONARY LESION: ICD-10-CM

## 2025-08-18 DIAGNOSIS — I25.10 CORONARY ARTERY DISEASE DUE TO CALCIFIED CORONARY LESION: ICD-10-CM

## (undated) DEVICE — DETERGENT RENUZYME PLUS 10 OZ PACKET (50/BX)

## (undated) DEVICE — GLOVE BIOGEL SZ 7 SURGICAL PF LTX - (50PR/BX 4BX/CA)

## (undated) DEVICE — GOWN SURGEONS X-LARGE - DISP. (30/CA)

## (undated) DEVICE — RESERVOIR SUCTION 100 CC - SILICONE (20EA/CA)

## (undated) DEVICE — CLIP MED INTNL HRZN TI ESCP - (25/BX)

## (undated) DEVICE — SPONGE GAUZESTER. 2X2 4-PL - (2/PK 50PK/BX 30BX/CS)

## (undated) DEVICE — POLY UMBILICAL TAPE 1/8X30 - (36/BX)

## (undated) DEVICE — SET EXTENSION WITH 2 PORTS (48EA/CA) ***PART #2C8610 IS A SUBSTITUTE*****

## (undated) DEVICE — MASK ANESTHESIA ADULT  - (100/CA)

## (undated) DEVICE — TUBING C&T SET FLYING LEADS DRAIN TUBING (10EA/BX)

## (undated) DEVICE — GLOVE BIOGEL INDICATOR SZ 6.5 SURGICAL PF LTX - (50PR/BX 4BX/CA)

## (undated) DEVICE — SUTURE 0 VICRYL PLUS CT-1 - 8 X 18 INCH (12/BX)

## (undated) DEVICE — DRAPE IOBAN II INCISE 23X17 - (10EA/BX 4BX/CA)

## (undated) DEVICE — SYRINGE NON SAFETY 10 CC 20 GA X 1-1/2 IN (100/BX 4BX/CA)

## (undated) DEVICE — MIDAS LUBRICATOR DIFFUSER PACK (4EA/CA)

## (undated) DEVICE — GOWN SURGICAL XX-LARGE - (28EA/CA) SIRUS NON REINFORCED

## (undated) DEVICE — SUTURE 4-0 SILK 12 X 18 INCH - (36/BX)

## (undated) DEVICE — LIGHT SOURCE MIS 12FT

## (undated) DEVICE — NEEDLE NON SAFETY 25 GA X 1 1/2 IN HYPO (100EA/BX)

## (undated) DEVICE — VESSELOOP MAXI BLUE STERILE- SURG-I-LOOP (10EA/BX)

## (undated) DEVICE — SUTURE 4-0 MONOCRYL PLUS PS-2 - 27 INCH (36/BX)

## (undated) DEVICE — PACK JACKSON TABLE KIT W/OUT - HR (6EA/CA)

## (undated) DEVICE — SUTURE GENERAL

## (undated) DEVICE — SUTURE 6-0 PROLENE BV-1 D/A 30 (36PK/BX)"

## (undated) DEVICE — SLEEVE, VASO, THIGH, MED

## (undated) DEVICE — PAD LAP STERILE 18 X 18 - (5/PK 40PK/CA)

## (undated) DEVICE — SYRINGE 30 ML LL (56/BX)

## (undated) DEVICE — ELECTRODE 850 FOAM ADHESIVE - HYDROGEL RADIOTRNSPRNT (50/PK)

## (undated) DEVICE — GOWN WARMING STANDARD FLEX - (30/CA)

## (undated) DEVICE — SET LEADWIRE 5 LEAD BEDSIDE DISPOSABLE ECG (1SET OF 5/EA)

## (undated) DEVICE — GLOVE BIOGEL SZ 6.5 SURGICAL PF LTX (50PR/BX 4BX/CA)

## (undated) DEVICE — SUTURE 3-0 SILK 12 X 18 IN - (36/BX)

## (undated) DEVICE — DERMABOND ADVANCED - (12EA/BX)

## (undated) DEVICE — LACTATED RINGERS INJ 1000 ML - (14EA/CA 60CA/PF)

## (undated) DEVICE — CHLORAPREP 26 ML APPLICATOR - ORANGE TINT(25/CA)

## (undated) DEVICE — SYRINGE NON SAFETY 5 CC 20 GA X 1-1/2 IN (100/BX 4BX/CA)

## (undated) DEVICE — SODIUM CHL IRRIGATION 0.9% 1000ML (12EA/CA)

## (undated) DEVICE — SUCTION INSTRUMENT YANKAUER BULBOUS TIP W/O VENT (50EA/CA)

## (undated) DEVICE — DECANTER FLD BLS - (50/CA)

## (undated) DEVICE — COVER LIGHT HANDLE ALC PLUS DISP (18EA/BX)

## (undated) DEVICE — SUTURE 2-0 VICRYL PLUS CT-1 - 8 X 18 INCH(12/BX)

## (undated) DEVICE — SUTURE 3-0 VICRYL PLUS SH - 27 INCH (36/BX)

## (undated) DEVICE — SUTURE 3-0 MONOCRYL PLUS PS-1 - 27 INCH (36/BX)

## (undated) DEVICE — HEADREST PRONEVIEW LARGE - (10/CA)

## (undated) DEVICE — SYRINGE ASEPTO - (50EA/CA

## (undated) DEVICE — TRANSDUCER ADULT DISP. SINGLE BONDED STERILE - (20EA/CA)

## (undated) DEVICE — GLOVE BIOGEL INDICATOR SZ 7.5 SURGICAL PF LTX - (50PR/BX 4BX/CA)

## (undated) DEVICE — CLIP SM INTNL HRZN TI ESCP LGT - (24EA/PK 25PK/BX)

## (undated) DEVICE — SENSOR SPO2 NEO LNCS ADHESIVE (20/BX) SEE USER NOTES

## (undated) DEVICE — DRESSING TRANSPARENT FILM TEGADERM 2.375 X 2.75"  (100EA/BX)"

## (undated) DEVICE — TOOL DISSECT MATCH HEAD

## (undated) DEVICE — KIT ANESTHESIA W/CIRCUIT & 3/LT BAG W/FILTER (20EA/CA)

## (undated) DEVICE — SODIUM CHL. INJ. 0.9% 500ML (24EA/CA 50CA/PF)

## (undated) DEVICE — TUBING CLEARLINK DUO-VENT - C-FLO (48EA/CA)

## (undated) DEVICE — CANISTER SUCTION 3000ML MECHANICAL FILTER AUTO SHUTOFF MEDI-VAC NONSTERILE LF DISP  (40EA/CA)

## (undated) DEVICE — PACK NEURO - (2EA/CA)

## (undated) DEVICE — SYRINGE SAFETY 10 ML 18 GA X 1 1/2 BLUNT LL (100/BX 4BX/CA)

## (undated) DEVICE — DRAIN J-VAC 7MM FLAT - (10EA/CA)

## (undated) DEVICE — STAPLER SKIN DISP - (6/BX 10BX/CA) VISISTAT

## (undated) DEVICE — INTRAOP NEURO IN OR 1:1 PER 15 MIN

## (undated) DEVICE — DRAPE LAPAROTOMY T SHEET - (12EA/CA)

## (undated) DEVICE — TOWELS CLOTH SURGICAL - (4/PK 20PK/CA)

## (undated) DEVICE — SUTURE 7-0 PROLENE BV-1 D/A 24 (36PK/BX)"

## (undated) DEVICE — DRAPE MICROSCOPE X-LONG (10EA/CA)

## (undated) DEVICE — ELECTRODE DUAL RETURN W/ CORD - (50/PK)

## (undated) DEVICE — DRAPE LARGE 3 QUARTER - (20/CA)

## (undated) DEVICE — SPONGE GAUZESTER 4 X 4 4PLY - (128PK/CA)

## (undated) DEVICE — PACK AV FISTULA (4EA/CA)

## (undated) DEVICE — SOD. CHL. INJ. 0.9% 1000 ML - (14EA/CA 60CA/PF)

## (undated) DEVICE — KIT SURGIFLO W/OUT THROMBIN - (6EA/CA)

## (undated) DEVICE — DRAPE MAGNETIC (INSTRA-MAG) - (30/CA)

## (undated) DEVICE — SUTURE 3-0 ETHILON FS-1 - (36/BX) 30 INCH

## (undated) DEVICE — Device

## (undated) DEVICE — INSTRUMENT JAWCOVER SUTURE - BOOTS (5PK/BX)

## (undated) DEVICE — PROTECTOR ULNA NERVE - (36PR/CA)

## (undated) DEVICE — VESSELOOP MINI BLUE STERILE - SURG-I-LOOP (10EA/BX)

## (undated) DEVICE — LACTATED RINGERS INJ. 500 ML - (24EA/CA)

## (undated) DEVICE — WIPE SURGICAL INSTRUMENT VISIWIPE 2-7/8IN X 2-7/8IN (20EA/PK)

## (undated) DEVICE — SYRINGE SAFETY TB 1 ML 27 GA X 1/2 IN (100/BX 4BX/CA)

## (undated) DEVICE — ARMREST CRADLE FOAM - (2PR/PK 12PR/CA)

## (undated) DEVICE — SYRINGE 10 ML CONTROL LL (25EA/BX 4BX/CA)

## (undated) DEVICE — SUTURE CV

## (undated) DEVICE — PAD BABY LAP 4X18 W/O - RINGS PREWASHED 5/PK 40PK/CS

## (undated) DEVICE — DRAIN J-P FLAT 7MM X 20CM - (10/CA)

## (undated) DEVICE — SHEET THYROID - (10EA/CA)

## (undated) DEVICE — TOWEL STOP TIMEOUT SAFETY FLAG (40EA/CA)

## (undated) DEVICE — SENSOR OXIMETER ADULT SPO2 RD SET (20EA/BX)

## (undated) DEVICE — TUBE E-T HI-LO CUFF 7.0MM (10EA/PK)

## (undated) DEVICE — NEPTUNE 4 PORT MANIFOLD - (20/PK)

## (undated) DEVICE — CLOSURE SKIN STRIP 1/2 X 4 IN - (STERI STRIP) (50/BX 4BX/CA)

## (undated) DEVICE — KIT ROOM DECONTAMINATION